# Patient Record
Sex: MALE | Race: WHITE | Employment: UNEMPLOYED | URBAN - METROPOLITAN AREA
[De-identification: names, ages, dates, MRNs, and addresses within clinical notes are randomized per-mention and may not be internally consistent; named-entity substitution may affect disease eponyms.]

---
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
## 2022-01-06 ENCOUNTER — VIRTUAL VISIT (OUTPATIENT)
Dept: PEDIATRICS | Facility: CLINIC | Age: 22
End: 2022-01-06
Payer: COMMERCIAL

## 2022-01-06 DIAGNOSIS — F41.9 ANXIETY: Primary | ICD-10-CM

## 2022-01-06 DIAGNOSIS — F32.1 CURRENT MODERATE EPISODE OF MAJOR DEPRESSIVE DISORDER WITHOUT PRIOR EPISODE (H): ICD-10-CM

## 2022-01-06 PROCEDURE — 99215 OFFICE O/P EST HI 40 MIN: CPT | Mod: GT | Performed by: PEDIATRICS

## 2022-01-06 NOTE — PATIENT INSTRUCTIONS
"          Thank you for choosing the [facility]'s Developmental and Behavioral Pediatrics Department for your care!     To schedule appointments please contact the [facility] at [phone].     For medication refills please contact your child's pharmacy.  Your pharmacy will direct you to contact the clinic if there are no refills left or, for \"schedule II\" (controlled substances), if there are no remaining prescription orders.  If you have been directed by your pharmacy to contact the clinic for a prescription renewal, please call us [phone] or contact us via your Epic MyChart account.  Please allow 5-7 days for your refill request to be processed and sent to your pharmacy.      For behavioral emergencies (immediate concern for your child s safety or the safety of another) please contact the Behavioral Emergency Center at [phone], go to your local Emergency Department or call 911.       For non-emergencies contact the [facility] at [phone] or reach out to us via [employer]. Please allow 3 business days for a response.      "

## 2022-01-06 NOTE — PROGRESS NOTES
Hawk Wiggins is a 21 year old male who is being evaluated via a billable video visit.      How would you like to obtain your AVS? through [employer]  Primary method for receiving video invitation: Text to cell phone: [phone]  If the video visit is dropped, the invitation should be resent by: N/A  Will anyone else be joining your video visit? Yasemin Khan, EMT    Video Start Time: 4:25 PM  Video-Visit Details    Type of service:  Video Visit    Video End Time:5:01 PM    Originating Location (pt. Location): Home    Distant Location (provider location):  [place] [facility] [employer] [facility]    Platform used for Video Visit: [email or web address]     ASSESSMENT:   1. ADHD, combined type.   2. Anxiety, periodic compulsions and obsessions; in remission.   3. Major depressive disorder, single episode, moderate.  4. Recent syncopal episode with accompanying symptoms.     RECOMMENDATIONS:   1. Diagnostic:  No new diagnostic studies. Due for an annual check-up with a wt/ht/BP.   2. 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 On academic probation spring semester 2022. Will access tutoring as necessary for his two courses this semester.   3. Diet:  Encourage nutritious diet.  4. Sleep:  Encourage regular sleep schedule.   5. Self-monitoring: No changes.   6. Medication: Continue fluoxetine 20 mg daily.   7. Self-regulation:  No changes.  8. Behavior modification: Continue to incorporate health-promoting behaviors.   9. Therapy: Re-establish weekly individual therapy as soon as possible.  10. Follow-up: Monthly    Appealed an academic suspension from [facility]. He got his appeal granted. He will restart classes on January 10.     He will be on probation for the semester and they will evaluate his GPA and completion rate to determine if he should stay.     His sense is that his classes over the summer were too rigorous to be compatible with what he was doing with his mental health  "over the summer.     This fall, he got a C, a B, and an A and completed 3 courses.     He's taking two courses this spring (history and a required math) and thinks he'll be able to navigate them pretty well. He's continuing to pursue an English degree. He describes himself as \"cautiously optimistic.\" He finds that college can \"bite you really quickly\" if you're not doing well.     For this semester, he's planning to take advantage of the tutoring services at school. He has not taken advantage of the tutoring sessions at [facility]. He thinks he'll trigger this if ever he notices he's finding he's not tracking the concepts.     In terms of emotional health, he's feeling stable now. He finds that his wellness is a little tied to the amount of \"drama\" he's experiencing in conflicts with his mother. She is worried that choices he's making in his personal life are affecting his studies. He is prioritizing school, then work, then social life. He think's he'll be able to make that commitment if he needs to.     He is about correction to his degree; about 2-3 more semesters to get the associate's degree and his generals completed.     Longer term, he thinks he may want to apply his skills in English. He's worried about whether he can make the kind of salary he wants to support himself pursuing a career in this field.     Hawk is a bit ambivalent about whether he will be able to live on his own in 2-3 years. He's worried about how expensive living on his own is.     41 minutes spent on the date of the encounter doing chart review, history and exam, documentation and further activities per the note    "

## 2022-01-06 NOTE — LETTER
1/6/2022      RE: Hawk Wiggins  [address]  [place] [address]       Hawk Wiggins is a 21 year old male who is being evaluated via a billable video visit.      How would you like to obtain your AVS? through [employer]  Primary method for receiving video invitation: Text to cell phone: [phone]  If the video visit is dropped, the invitation should be resent by: N/A  Will anyone else be joining your video visit? No    Kingsley Khan, EMT    Video Start Time: 4:25 PM  Video-Visit Details    Type of service:  Video Visit    Video End Time:5:01 PM    Originating Location (pt. Location): Home    Distant Location (provider location):  [employer] [facility] [employer]    Platform used for Video Visit: Arcamed     ASSESSMENT:   1. ADHD, combined type.   2. Anxiety, periodic compulsions and obsessions; in remission.   3. Major depressive disorder, single episode, moderate.  4. Recent syncopal episode with accompanying symptoms.     RECOMMENDATIONS:   1. Diagnostic:  No new diagnostic studies. Due for an annual check-up with a wt/ht/BP.   2. 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 On academic probation spring semester 2022. Will access tutoring as necessary for his two courses this semester.   3. Diet:  Encourage nutritious diet.  4. Sleep:  Encourage regular sleep schedule.   5. Self-monitoring: No changes.   6. Medication: Continue fluoxetine 20 mg daily.   7. Self-regulation:  No changes.  8. Behavior modification: Continue to incorporate health-promoting behaviors.   9. Therapy: Re-establish weekly individual therapy as soon as possible.  10. Follow-up: Monthly    Appealed an academic suspension from [employer]. He got his appeal granted. He will restart classes on January 10.     He will be on probation for the semester and they will evaluate his GPA and completion rate to determine if he should stay.     His sense is that his classes over the  "summer were too rigorous to be compatible with what he was doing with his mental health over the summer.     This fall, he got a C, a B, and an A and completed 3 courses.     He's taking two courses this spring (history and a required math) and thinks he'll be able to navigate them pretty well. He's continuing to pursue an English degree. He describes himself as \"cautiously optimistic.\" He finds that college can \"bite you really quickly\" if you're not doing well.     For this semester, he's planning to take advantage of the tutoring services at school. He has not taken advantage of the tutoring sessions at [facility]. He thinks he'll trigger this if ever he notices he's finding he's not tracking the concepts.     In terms of emotional health, he's feeling stable now. He finds that his wellness is a little tied to the amount of \"drama\" he's experiencing in conflicts with his mother. She is worried that choices he's making in his personal life are affecting his studies. He is prioritizing school, then work, then social life. He think's he'll be able to make that commitment if he needs to.     He is about detention to his degree; about 2-3 more semesters to get the associate's degree and his generals completed.     Longer term, he thinks he may want to apply his skills in English. He's worried about whether he can make the kind of salary he wants to support himself pursuing a career in this field.     Hawk is a bit ambivalent about whether he will be able to live on his own in 2-3 years. He's worried about how expensive living on his own is.     41 minutes spent on the date of the encounter doing chart review, history and exam, documentation and further activities per the note        Crystal Chirinos MD    "

## 2022-02-17 ENCOUNTER — VIRTUAL VISIT (OUTPATIENT)
Dept: PEDIATRICS | Facility: CLINIC | Age: 22
End: 2022-02-17
Payer: COMMERCIAL

## 2022-02-17 DIAGNOSIS — F41.9 ANXIETY: ICD-10-CM

## 2022-02-17 DIAGNOSIS — F90.2 ATTENTION DEFICIT HYPERACTIVITY DISORDER, COMBINED TYPE: Primary | ICD-10-CM

## 2022-02-17 DIAGNOSIS — F32.1 CURRENT MODERATE EPISODE OF MAJOR DEPRESSIVE DISORDER WITHOUT PRIOR EPISODE (H): ICD-10-CM

## 2022-02-17 PROCEDURE — 99215 OFFICE O/P EST HI 40 MIN: CPT | Mod: GT | Performed by: PEDIATRICS

## 2022-02-17 NOTE — LETTER
2/17/2022      RE: Hawk Wiggins  [address]  [place] [address]       Hawk Wiggins is a 21 year old male who is being evaluated via a billable video visit.      How would you like to obtain your AVS? through [employer]  Primary method for receiving video invitation: Text to cell phone: [phone]  If the video visit is dropped, the invitation should be resent by: N/A  Will anyone else be joining your video visit? Yasemin Khan, EMT    Video Start Time: 4:26 PM  Video-Visit Details    Type of service:  Video Visit    Video End Time:5:04 PM    Originating Location (pt. Location): Home    Distant Location (provider location):  [employer]    Platform used for Video Visit: [employer]    ASSESSMENT:   1. ADHD, combined type.   2. Anxiety, periodic compulsions and obsessions; in remission.   3. Major depressive disorder, single episode, moderate.  4. Recent syncopal episode with accompanying symptoms.     RECOMMENDATIONS:   1. Diagnostic:  No new diagnostic studies. Due for an annual check-up with a wt/ht/BP.   2. 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 On academic probation spring semester 2022. Will access tutoring as necessary for his two courses this semester.   3. Diet:  Encourage nutritious diet.  4. Sleep:  Encourage regular sleep schedule.   5. Self-monitoring: No changes.   6. Medication: Continue fluoxetine 20 mg daily.   7. Self-regulation:  No changes.  8. Behavior modification: Continue to incorporate health-promoting behaviors.   9. Therapy: Re-establish weekly individual therapy as soon as possible.  10. Follow-up: Monthly    Focusing on school and keeping his grades up. Sometimes he struggles with motivation. Each day the motivation seems to get better over time. Life is a little boring because it's the same schedule every day. He is aware of his need to succeed at school.     He has had some difficulties  "over the last month. His girlfriend, Amira, cheated and they broke up. It was \"really hard\" and \"the truth is, the pain doesn't really go away but you get strong enough to get through it.\"     He is thinking about going to [place] for a few days over spring break. He's been FaceTiming and texting with someone online who lives in [place]. His mother is concerned about him doing that and isn't willing to finance the trip.     Discussed challenges of young adult life, independence, academic success, and finding ordinary kia in any circumstances.    Denies depression relapse/mood difficulty/safety concerns.         43 minutes spent on the date of the encounter doing chart review, history and exam, documentation and further activities per the note        Crystal Chirinos MD    "

## 2022-02-17 NOTE — PATIENT INSTRUCTIONS
"            Thank you for choosing the [facility]'s Developmental and Behavioral Pediatrics Department for your care!     To schedule appointments please contact the [facility] at [phone].     For medication refills please contact your child's pharmacy.  Your pharmacy will direct you to contact the clinic if there are no refills left or, for \"schedule II\" (controlled substances), if there are no remaining prescription orders.  If you have been directed by your pharmacy to contact the clinic for a prescription renewal, please call us [phone] or contact us via your Epic MyChart account.  Please allow 5-7 days for your refill request to be processed and sent to your pharmacy.      For behavioral emergencies (immediate concern for your child s safety or the safety of another) please contact the Behavioral Emergency Center at [phone], go to your local Emergency Department or call 911.       For non-emergencies contact the [facility] at [phone] or reach out to us via [employer]. Please allow 3 business days for a response.      "

## 2022-02-17 NOTE — PROGRESS NOTES
Hawk Wiggins is a 21 year old male who is being evaluated via a billable video visit.      How would you like to obtain your AVS? through [employer]  Primary method for receiving video invitation: Text to cell phone: [phone]  If the video visit is dropped, the invitation should be resent by: N/A  Will anyone else be joining your video visit? Yasemin Khan, EMT    Video Start Time: 4:26 PM  Video-Visit Details    Type of service:  Video Visit    Video End Time:5:04 PM    Originating Location (pt. Location): Home    Distant Location (provider location):  [employer] [facility] [employer] [facility]    Platform used for Video Visit: Eviti    ASSESSMENT:   1. ADHD, combined type.   2. Anxiety, periodic compulsions and obsessions; in remission.   3. Major depressive disorder, single episode, moderate.  4. Recent syncopal episode with accompanying symptoms.     RECOMMENDATIONS:   1. Diagnostic:  No new diagnostic studies. Due for an annual check-up with a wt/ht/BP.   2. 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 On academic probation spring semester 2022. Will access tutoring as necessary for his two courses this semester.   3. Diet:  Encourage nutritious diet.  4. Sleep:  Encourage regular sleep schedule.   5. Self-monitoring: No changes.   6. Medication: Continue fluoxetine 20 mg daily.   7. Self-regulation:  No changes.  8. Behavior modification: Continue to incorporate health-promoting behaviors.   9. Therapy: Re-establish weekly individual therapy as soon as possible.  10. Follow-up: Monthly    Focusing on school and keeping his grades up. Sometimes he struggles with motivation. Each day the motivation seems to get better over time. Life is a little boring because it's the same schedule every day. He is aware of his need to succeed at school.     He has had some difficulties over the last month. His girlfriend, Amira, cheated and they broke up. It was  "\"really hard\" and \"the truth is, the pain doesn't really go away but you get strong enough to get through it.\"     He is thinking about going to [place] for a few days over spring break. He's been FaceTiming and texting with someone online who lives in [place]. His mother is concerned about him doing that and isn't willing to finance the trip.     Discussed challenges of young adult life, independence, academic success, and finding ordinary kia in any circumstances.    Denies depression relapse/mood difficulty/safety concerns.         43 minutes spent on the date of the encounter doing chart review, history and exam, documentation and further activities per the note    "

## 2022-03-17 ENCOUNTER — VIRTUAL VISIT (OUTPATIENT)
Dept: PEDIATRICS | Facility: CLINIC | Age: 22
End: 2022-03-17
Payer: COMMERCIAL

## 2022-03-17 DIAGNOSIS — F41.9 ANXIETY: ICD-10-CM

## 2022-03-17 DIAGNOSIS — F32.1 CURRENT MODERATE EPISODE OF MAJOR DEPRESSIVE DISORDER WITHOUT PRIOR EPISODE (H): Primary | ICD-10-CM

## 2022-03-17 PROCEDURE — 99215 OFFICE O/P EST HI 40 MIN: CPT | Mod: GT | Performed by: PEDIATRICS

## 2022-03-17 NOTE — PATIENT INSTRUCTIONS
"Thank you for choosing the [facility]'s Developmental and Behavioral Pediatrics Department for your care!     To schedule appointments please contact the [facility] at [phone].     For medication refills please contact your child's pharmacy.  Your pharmacy will direct you to contact the clinic if there are no refills left or, for \"schedule II\" (controlled substances), if there are no remaining prescription orders.  If you have been directed by your pharmacy to contact the clinic for a prescription renewal, please call us [phone] or contact us via your Epic MyChart account.  Please allow 5-7 days for your refill request to be processed and sent to your pharmacy.      For behavioral emergencies (immediate concern for your child s safety or the safety of another) please contact the Behavioral Emergency Center at [phone], go to your local Emergency Department or call 911.       For non-emergencies contact the [facility] at [phone] or reach out to us via [employer]. Please allow 3 business days for a response.      "

## 2022-03-17 NOTE — PROGRESS NOTES
Hawk Wiggins is a 21 year old male who is being evaluated via a billable video visit.      How would you like to obtain your AVS? through [employer]  Primary method for receiving video invitation: Text to cell phone: [phone]  If the video visit is dropped, the invitation should be resent by: N/A  Will anyone else be joining your video visit? No      Video Start Time: 4:24 PM  Video-Visit Details    Type of service:  Video Visit    Video End Time:5:04 PM    Originating Location (pt. Location): Home    Distant Location (provider location):  [employer]    Platform used for Video Visit: CVRx     ASSESSMENT:   1. ADHD, combined type.   2. Anxiety, periodic compulsions and obsessions; in remission.   3. Major depressive disorder, single episode, moderate.  4. Recent syncopal episode with accompanying symptoms.     RECOMMENDATIONS:   1. Diagnostic:  No new diagnostic studies. Due for an annual check-up with a wt/ht/BP.   2. 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 On academic probation spring semester 2022. Will access tutoring as necessary for his two courses this semester.   3. Diet:  Encourage nutritious diet.  4. Sleep:  Encourage regular sleep schedule.   5. Self-monitoring: No changes.   6. Medication: Continue fluoxetine 20 mg daily.   7. Self-regulation:  Navigating emotional challenges of a recent break-up with infidelity.  8. Behavior modification: Continue to incorporate health-promoting behaviors.   9. Therapy: Re-establish weekly individual therapy as soon as possible.  10. Follow-up: Monthly    Still experiencing the emotional effects of being cheated on in January by his girlfriend. He's experiencing another wave of those emotions now. Discussed the personal impact of being cheated on and the way on interpreting that experience.     41 minutes spent on the date of the encounter doing chart review, history and exam,  documentation and further activities per the note

## 2022-03-17 NOTE — LETTER
3/17/2022      RE: Hawk Wiggins  [address]  [place] [address]       Hawk Wiggins is a 21 year old male who is being evaluated via a billable video visit.      How would you like to obtain your AVS? through [employer]  Primary method for receiving video invitation: Text to cell phone: [phone]  If the video visit is dropped, the invitation should be resent by: N/A  Will anyone else be joining your video visit? No      Video Start Time: 4:24 PM  Video-Visit Details    Type of service:  Video Visit    Video End Time:5:04 PM    Originating Location (pt. Location): Home    Distant Location (provider location):  [employer] [facility] [employer]    Platform used for Video Visit: [email or web address]     ASSESSMENT:   1. ADHD, combined type.   2. Anxiety, periodic compulsions and obsessions; in remission.   3. Major depressive disorder, single episode, moderate.  4. Recent syncopal episode with accompanying symptoms.     RECOMMENDATIONS:   1. Diagnostic:  No new diagnostic studies. Due for an annual check-up with a wt/ht/BP.   2. 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 On academic probation spring semester 2022. Will access tutoring as necessary for his two courses this semester.   3. Diet:  Encourage nutritious diet.  4. Sleep:  Encourage regular sleep schedule.   5. Self-monitoring: No changes.   6. Medication: Continue fluoxetine 20 mg daily.   7. Self-regulation:  Navigating emotional challenges of a recent break-up with infidelity.  8. Behavior modification: Continue to incorporate health-promoting behaviors.   9. Therapy: Re-establish weekly individual therapy as soon as possible.  10. Follow-up: Monthly    Still experiencing the emotional effects of being cheated on in January by his girlfriend. He's experiencing another wave of those emotions now. Discussed the personal impact of being cheated on and the way on interpreting that experience.     41  minutes spent on the date of the encounter doing chart review, history and exam, documentation and further activities per the note        Crystal Chirinos MD

## 2022-04-21 ENCOUNTER — VIRTUAL VISIT (OUTPATIENT)
Dept: PEDIATRICS | Facility: CLINIC | Age: 22
End: 2022-04-21
Payer: COMMERCIAL

## 2022-04-21 DIAGNOSIS — F41.9 ANXIETY: Primary | ICD-10-CM

## 2022-04-21 DIAGNOSIS — F32.1 CURRENT MODERATE EPISODE OF MAJOR DEPRESSIVE DISORDER WITHOUT PRIOR EPISODE (H): ICD-10-CM

## 2022-04-21 PROCEDURE — 99214 OFFICE O/P EST MOD 30 MIN: CPT | Mod: GT | Performed by: PEDIATRICS

## 2022-04-21 NOTE — LETTER
4/21/2022    RE: Hawk Wiggins  [address]  [facility] [address]     Dear Colleague,    Thank you for referring your patient, Hawk Wiggins, to the [facility]. Please see a copy of my visit note below.    Hawk Wiggins is a 21 year old male who is being evaluated via a billable video visit.      How would you like to obtain your AVS? through [employer]  Primary method for receiving video invitation: Text to cell phone: [phone]  If the video visit is dropped, the invitation should be resent by: N/A  Will anyone else be joining your video visit? No    Kingsley Khan, EMT    Video Start Time: 11:22 AM  Video-Visit Details    Type of service:  Video Visit    Video End Time:11:49 AM    Originating Location (pt. Location): Home    Distant Location (provider location[facility]  [facility]    Platform used for Video Visit: [facility]    ASSESSMENT:   1. ADHD, combined type.   2. Anxiety, periodic compulsions and obsessions; in remission.   3. Major depressive disorder, single episode, moderate.  4. Recent syncopal episode with accompanying symptoms.     RECOMMENDATIONS:   1. Diagnostic:  No new diagnostic studies. Due for an annual check-up with a wt/ht/BP.   2. 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 On academic probation spring semester 2022. Will access tutoring as necessary for his two courses this semester.   3. Diet:  Encourage nutritious diet.  4. Sleep:  Encourage regular sleep schedule.   5. Self-monitoring: No changes.   6. Medication: Continue fluoxetine 20 mg daily for now. This does not seem to be therapeutically adequate at this time. Complicating the picture, he is on a break from individual therapy. Once individual therapy is reestablished, we can make a medication change. Hawk would rather stop fluoxetine. Past attempts to maximize dose were ineffective and reduced his appetite. He took  "sertraline about 9 years ago but doesn't remember if it was effective. Ecitalopram would be a good option as an alternative to fluoxetine.   7. Self-regulation:  Navigating emotional challenges of a recent break-up with infidelity.  8. Behavior modification: Continue to incorporate health-promoting behaviors.   9. Therapy: Re-establish weekly individual therapy as soon as possible.  10. Follow-up: Monthly. Reach out through [facility] if therapy established and we can move forward with medication change.     \"I'm hanging in there. College sucks sometimes.\" He finds the work to be tedious at times. He has one more month before the end of the semester ends. He thinks the semester will end successfully.     \"I've endured a lot of change.\" Was in a relationship at the beginning of the semester and now they're broken up.     He's been experiencing \"a lot of anxiety\" recently. He and his mother are both generally reluctant to take medication. He has noticed that his anxiety has gotten quite a bit worse recently \"even to the point of almost being a panic attack.\" He finds that his anxiety is in the background and feels \"like a pit in the stomach that makes me doubt myself.\"     He's explored other practices to \"subdue\" the anxiety reaction. He's wondering about a medication regimen.     30 minutes spent on the date of the encounter doing chart review, history and exam, documentation and further activities per the note    Again, thank you for allowing me to participate in the care of your patient.      Sincerely,    Crystal Chirinos MD  "

## 2022-04-21 NOTE — PROGRESS NOTES
Hawk Wiggins is a 21 year old male who is being evaluated via a billable video visit.      How would you like to obtain your AVS? through [employer] [place]  Primary method for receiving video invitation: Text to cell phone: [phone]  If the video visit is dropped, the invitation should be resent by: N/A  Will anyone else be joining your video visit? No    Kingsley Khan, EMT    Video Start Time: 11:22 AM  Video-Visit Details    Type of service:  Video Visit    Video End Time:11:49 AM    Originating Location (pt. Location): Home    Distant Location (provider location):  [employer] [facility] [employer] [facility]    Platform used for Video Visit: [employer]    ASSESSMENT:   1. ADHD, combined type.   2. Anxiety, periodic compulsions and obsessions; in remission.   3. Major depressive disorder, single episode, moderate.  4. Recent syncopal episode with accompanying symptoms.     RECOMMENDATIONS:   1. Diagnostic:  No new diagnostic studies. Due for an annual check-up with a wt/ht/BP.   2. 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 On academic probation spring semester 2022. Will access tutoring as necessary for his two courses this semester.   3. Diet:  Encourage nutritious diet.  4. Sleep:  Encourage regular sleep schedule.   5. Self-monitoring: No changes.   6. Medication: Continue fluoxetine 20 mg daily for now. This does not seem to be therapeutically adequate at this time. Complicating the picture, he is on a break from individual therapy. Once individual therapy is reestablished, we can make a medication change. Hawk would rather stop fluoxetine. Past attempts to maximize dose were ineffective and reduced his appetite. He took sertraline about 9 years ago but doesn't remember if it was effective. Ecitalopram would be a good option as an alternative to fluoxetine.   7. Self-regulation:  Navigating emotional challenges of a recent break-up with infidelity.  8. Behavior  "modification: Continue to incorporate health-promoting behaviors.   9. Therapy: Re-establish weekly individual therapy as soon as possible.  10. Follow-up: Monthly. Reach out through [facility] if therapy established and we can move forward with medication change.     \"I'm hanging in there. College sucks sometimes.\" He finds the work to be tedious at times. He has one more month before the end of the semester ends. He thinks the semester will end successfully.     \"I've endured a lot of change.\" Was in a relationship at the beginning of the semester and now they're broken up.     He's been experiencing \"a lot of anxiety\" recently. He and his mother are both generally reluctant to take medication. He has noticed that his anxiety has gotten quite a bit worse recently \"even to the point of almost being a panic attack.\" He finds that his anxiety is in the background and feels \"like a pit in the stomach that makes me doubt myself.\"     He's explored other practices to \"subdue\" the anxiety reaction. He's wondering about a medication regimen.     30 minutes spent on the date of the encounter doing chart review, history and exam, documentation and further activities per the note    "

## 2022-04-25 ENCOUNTER — TELEPHONE (OUTPATIENT)
Dept: PSYCHIATRY | Facility: CLINIC | Age: 22
End: 2022-04-25
Payer: COMMERCIAL

## 2022-04-25 ENCOUNTER — TELEPHONE (OUTPATIENT)
Dept: PEDIATRICS | Facility: CLINIC | Age: 22
End: 2022-04-25
Payer: COMMERCIAL

## 2022-04-25 NOTE — TELEPHONE ENCOUNTER
[facility]    Phone Message    May a detailed message be left on voicemail: yes     Reason for Call: Medication Question or concern regarding medication   Prescription Clarification  Name of Medication: Didn't recall in   Prescribing Provider: Dr. ALFIE Chirinos   Pharmacy: [facility] [address] [address] (Ph: [phone])   What on the order needs clarification? Requesting info regarding medications - note from appt 04/21 noted possible medication change from fluoxetine to escitalopram          Action Taken: Message routed to:  Other: P MIDB PEDS DB    Travel Screening: Not Applicable

## 2022-04-27 NOTE — TELEPHONE ENCOUNTER
See MyChart encounter dated 4/25 for further details.    This encounter will be closed.    Brigid Goodson RN

## 2022-05-19 ENCOUNTER — VIRTUAL VISIT (OUTPATIENT)
Dept: PEDIATRICS | Facility: CLINIC | Age: 22
End: 2022-05-19
Payer: COMMERCIAL

## 2022-05-19 DIAGNOSIS — F90.2 ATTENTION DEFICIT HYPERACTIVITY DISORDER, COMBINED TYPE: ICD-10-CM

## 2022-05-19 DIAGNOSIS — F41.9 ANXIETY: ICD-10-CM

## 2022-05-19 DIAGNOSIS — F32.1 CURRENT MODERATE EPISODE OF MAJOR DEPRESSIVE DISORDER WITHOUT PRIOR EPISODE (H): Primary | ICD-10-CM

## 2022-05-19 PROCEDURE — 99214 OFFICE O/P EST MOD 30 MIN: CPT | Mod: GT | Performed by: PEDIATRICS

## 2022-05-19 NOTE — PROGRESS NOTES
Hawk Wiggins is a 21 year old male who is being evaluated via a billable video visit.      How would you like to obtain your AVS? through [employer]  Primary method for receiving video invitation: Text to cell phone: [phone]   If the video visit is dropped, the invitation should be resent by: N/A  Will anyone else be joining your video visit? No    Beverley Diez CMA    Video Start Time: 10:41 AM  Video-Visit Details    Type of service:  Video Visit    Video End Time:11:11 AM    Originating Location (pt. Location): Home    Distant Location (provider location):  [employer] FOR THE [employer] [facility]    Platform used for Video Visit: [employer]    ASSESSMENT:   1. ADHD, combined type.   2. Anxiety, periodic compulsions and obsessions; in remission.   3. Major depressive disorder, single episode, moderate.  4. Recent syncopal episode with accompanying symptoms.     RECOMMENDATIONS:   1. Diagnostic:  No new diagnostic studies. Due for an annual check-up with a wt/ht/BP.   2. 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 On academic probation spring semester 2022. Heading into summer term. Exploring taking courses at the University.    3. Diet:  Encourage nutritious diet.  4. Sleep:  Encourage regular sleep schedule.   5. Self-monitoring: No changes.   6. Medication: Continue ecitalopram 10 mg daily. Medication working well.   7. Self-regulation:  Begin to diversify your network of people and network of resources to meet the needs that were being met in that relationship. Continue to accept the time needed to navigate a grief reaction.  8. Behavior modification: Continue to incorporate health-promoting behaviors.   9. Therapy: Individual therapy every 1-2 weeks. Male therapist who is at [facility] [place]. That therapy is very helpful.   10. Follow-up: Monthly. Reach out through [employer] if therapy established and we can move forward with medication change.     New semester is  "starting up at [employer]. He's on a break right now. Spring semester went really well and Hawk ended up with good grades. He made some changes to keep up with his work and get everything completed before turning it in. He \"just grinded out all the school work.\" He was very glad he was able to do it. The \"education piece is fantastic.\" He's going to see if he can get a few courses at the [employer] in the fall.     He was reminded of his ex-girlfriend when he drove by her house. He \"trying to fight back against this attachment to her.\" He seems to be having a strong grief reaction when reminded of his relationship with his ex-girlfriend.     The medication is \"really helpful.\" He's got a \"better mindset\" and \"it's been a game changer.\" He is a little concerned about his     35 minutes spent on the date of the encounter doing chart review, history and exam, documentation and further activities per the note    "

## 2022-05-19 NOTE — PATIENT INSTRUCTIONS
"    Thank you for choosing the [facility]'s Developmental and Behavioral Pediatrics Department for your care!     To schedule appointments please contact the [facility] at [phone].     For medication refills please contact your child's pharmacy.  Your pharmacy will direct you to contact the clinic if there are no refills left or, for \"schedule II\" (controlled substances), if there are no remaining prescription orders.  If you have been directed by your pharmacy to contact the clinic for a prescription renewal, please call us [phone] or contact us via your Epic MyChart account.  Please allow 5-7 days for your refill request to be processed and sent to your pharmacy.      For behavioral emergencies (immediate concern for your child s safety or the safety of another) please contact the Behavioral Emergency Center at [phone], go to your local Emergency Department or call 911.       For non-emergencies contact the [facility] at [phone] or reach out to us via Network. Please allow 3 business days for a response.    "

## 2022-05-19 NOTE — LETTER
"  5/19/2022      RE: Hawk Wiggins  [address]  [facility] [address]     Dear Colleague,    Thank you for referring your patient, Hawk Wiggins, to the [facility]. Please see a copy of my visit note below.      ASSESSMENT:   1. ADHD, combined type.   2. Anxiety, periodic compulsions and obsessions; in remission.   3. Major depressive disorder, single episode, moderate.  4. Recent syncopal episode with accompanying symptoms.     RECOMMENDATIONS:   1. Diagnostic:  No new diagnostic studies. Due for an annual check-up with a wt/ht/BP.   2. Education:  [employer]. Approximately half way to an associate's degree in English. On academic probation spring semester 2022. Heading into summer term. Exploring taking courses at the [employer].    3. Diet:  Encourage nutritious diet.  4. Sleep:  Encourage regular sleep schedule.   5. Self-monitoring: No changes.   6. Medication: Continue ecitalopram 10 mg daily. Medication working well.   7. Self-regulation:  Begin to diversify your network of people and network of resources to meet the needs that were being met in that relationship. Continue to accept the time needed to navigate a grief reaction.  8. Behavior modification: Continue to incorporate health-promoting behaviors.   9. Therapy: Individual therapy every 1-2 weeks. Male therapist who is at [facility] [place]. That therapy is very helpful.   10. Follow-up: Monthly. Reach out through [facility] if therapy established and we can move forward with medication change.     New semester is starting up at [employer]. He's on a break right now. Spring semester went really well and Hawk ended up with good grades. He made some changes to keep up with his work and get everything completed before turning it in. He \"just grinded out all the school work.\" He was very glad he was able to do it. The \"education piece is fantastic.\" He's going to see if he can get a few courses at the " "[employer] in the fall.     He was reminded of his ex-girlfriend when he drove by her house. He \"trying to fight back against this attachment to her.\" He seems to be having a strong grief reaction when reminded of his relationship with his ex-girlfriend.     The medication is \"really helpful.\" He's got a \"better mindset\" and \"it's been a game changer.\" He is a little concerned about his     35 minutes spent on the date of the encounter doing chart review, history and exam, documentation and further activities per the note        Again, thank you for allowing me to participate in the care of your patient.      Sincerely,    Crystal Chirinos MD    "

## 2022-06-13 ENCOUNTER — TELEPHONE (OUTPATIENT)
Dept: PEDIATRICS | Facility: CLINIC | Age: 22
End: 2022-06-13
Payer: COMMERCIAL

## 2022-06-13 NOTE — TELEPHONE ENCOUNTER
A return call was placed to the mother of Hawk Wiggins, Ghazal, today (06/13/22) at 1:58 PM to inform her that the extent of the Consent to Communicate would not cover something of a legal nature, and we discussed whether or not Hawk would be able to speak to the concern that they were referencing.  Ghazal has confirmed that Hawk can discuss this with us and she will ask that he call the [facility] clinic to provide more detail.    Brigid Goodson RN    Please transfer Hawk's return phone call to Fouzia PALACIOS.  Hawk can leave a detailed message if he goes to [facility].

## 2022-06-13 NOTE — TELEPHONE ENCOUNTER
" Health Call Center    Phone Message    May a detailed message be left on voicemail: yes     Reason for Call: Other: Father requesting to speak with Dr. Chirinos regarding \"legalities\". Declined speaking with an RN and insistent on only speaking with Taran. Further details not given.      *Per dad, please call Ghazal cornejo    Action Taken: Message routed to:  Other: P MIDB PEDS DB    Travel Screening: Not Applicable                                                                        "

## 2022-06-16 ENCOUNTER — VIRTUAL VISIT (OUTPATIENT)
Dept: PEDIATRICS | Facility: CLINIC | Age: 22
End: 2022-06-16
Payer: COMMERCIAL

## 2022-06-16 DIAGNOSIS — F32.1 CURRENT MODERATE EPISODE OF MAJOR DEPRESSIVE DISORDER WITHOUT PRIOR EPISODE (H): ICD-10-CM

## 2022-06-16 DIAGNOSIS — F41.9 ANXIETY: ICD-10-CM

## 2022-06-16 PROCEDURE — 99214 OFFICE O/P EST MOD 30 MIN: CPT | Mod: GT | Performed by: PEDIATRICS

## 2022-06-16 RX ORDER — ESCITALOPRAM OXALATE 20 MG/1
20 TABLET ORAL DAILY
Qty: 30 TABLET | Refills: 3 | Status: SHIPPED | OUTPATIENT
Start: 2022-06-16 | End: 2022-10-06

## 2022-06-16 NOTE — PATIENT INSTRUCTIONS
"Thank you for choosing the [facility]'s Developmental and Behavioral Pediatrics Department for your care!     To schedule appointments please contact the [facility] at [phone].     For medication refills please contact your child's pharmacy.  Your pharmacy will direct you to contact the clinic if there are no refills left or, for \"schedule II\" (controlled substances), if there are no remaining prescription orders.  If you have been directed by your pharmacy to contact the clinic for a prescription renewal, please call us [phone] or contact us via your Epic MyChart account.  Please allow 5-7 days for your refill request to be processed and sent to your pharmacy.      For behavioral emergencies (immediate concern for your child s safety or the safety of another) please contact the Behavioral Emergency Center at [phone], go to your local Emergency Department or call 911.       For non-emergencies contact the [facility] at [phone] or reach out to us via [employer]. Please allow 3 business days for a response.    "

## 2022-06-16 NOTE — PROGRESS NOTES
"Hawk Wiggins is a 21 year old male who is being evaluated via a billable video visit.        How would you like to obtain your AVS? through [employer]  Primary method for receiving video invitation: Text to cell phone: [phone]  If the video visit is dropped, the invitation should be resent by: Text to cell phone: [phone]  Will anyone else be joining your video visit? No      Type of service:  Video Visit    Video-Visit Details    Video Start Time: 3:11 PM    Video End Time:3:38 PM  Originating Location (pt. Location): Home    Distant Location (provider location):  [facility] [place]    Platform used for Video Visit: [employer]    ASSESSMENT:   1. ADHD, combined type.   2. Anxiety, periodic compulsions and obsessions; in remission.   3. Major depressive disorder, single episode, moderate.  4. Recent syncopal episode with accompanying symptoms.     RECOMMENDATIONS:   1. Diagnostic:  No new diagnostic studies. Due for an annual check-up with a wt/ht/BP.   2. 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 On academic probation spring semester 2022. Heading into summer term. Exploring taking courses at the [employer].    3. Diet:  Encourage nutritious diet.  4. Sleep:  Encourage regular sleep schedule.   5. Self-monitoring: No changes.   6. Medication: Still having significant breakthrough anxiety on 10 mg daily of ecitalopram. Increase to 20 mg daily.   7. Self-regulation:  Begin to diversify your network of people and network of resources to meet the needs that were being met in that relationship. Continue to accept the time needed to navigate a grief reaction.  8. Behavior modification: Continue to incorporate health-promoting behaviors.   9. Therapy: Individual therapy every 1-2 weeks. Male therapist who is at [facility] [place]. That therapy is very helpful.   10. Follow-up: Monthly.     \"I'm hanging in there.\" and \"Healing from the Amira situation.\" He " "finds that he really misses her. He misses the stuff they used to do together. Missing the connection and support of the relationship.     He's taking 10 mg of ecitalopram. It \"takes the edge off\" of the perseverative anxiety. However, he gets some breakthrough anxiety and would like to have a better medication approach for his anxiety. He is open to increasing to his ecitalopram or adding hydroxyzine to his regimen.     31 minutes spent on the date of the encounter doing chart review, history and exam, documentation and further activities per the note    "

## 2022-06-16 NOTE — LETTER
"  6/16/2022      RE: Hawk Wiggins  [address]  [place] [facility] [place] [facility] [address]     Dear Colleague,    Thank you for referring your patient, Hawk Wiggins, to the [facility]. Please see a copy of my visit note below.    Hawk Wiggins is a 21 year old male who is being evaluated via a billable video visit.        Distant Location (provider location):  [facility] [employer]    Platform used for Video Visit: "[employer]    ASSESSMENT:   1. ADHD, combined type.   2. Anxiety, periodic compulsions and obsessions; in remission.   3. Major depressive disorder, single episode, moderate.  4. Recent syncopal episode with accompanying symptoms.     RECOMMENDATIONS:   1. Diagnostic:  No new diagnostic studies. Due for an annual check-up with a wt/ht/BP.   2. 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 On academic probation spring semester 2022. Heading into summer term. Exploring taking courses at the [employer].    3. Diet:  Encourage nutritious diet.  4. Sleep:  Encourage regular sleep schedule.   5. Self-monitoring: No changes.   6. Medication: Still having significant breakthrough anxiety on 10 mg daily of ecitalopram. Increase to 20 mg daily.   7. Self-regulation:  Begin to diversify your network of people and network of resources to meet the needs that were being met in that relationship. Continue to accept the time needed to navigate a grief reaction.  8. Behavior modification: Continue to incorporate health-promoting behaviors.   9. Therapy: Individual therapy every 1-2 weeks. Male therapist who is at [facility] [place]. That therapy is very helpful.   10. Follow-up: Monthly.     \"I'm hanging in there.\" and \"Healing from the Amira situation.\" He finds that he really misses her. He misses the stuff they used to do together. Missing the connection and support of the relationship.     He's taking 10 mg of ecitalopram. It \"takes the " "edge off\" of the perseverative anxiety. However, he gets some breakthrough anxiety and would like to have a better medication approach for his anxiety. He is open to increasing to his ecitalopram or adding hydroxyzine to his regimen.     31 minutes spent on the date of the encounter doing chart review, history and exam, documentation and further activities per the note      Again, thank you for allowing me to participate in the care of your patient.      Sincerely,    Crystal Chirinos MD    "

## 2022-06-17 ENCOUNTER — TELEPHONE (OUTPATIENT)
Dept: PEDIATRICS | Facility: CLINIC | Age: 22
End: 2022-06-17

## 2022-06-17 DIAGNOSIS — F41.9 ANXIETY: Primary | ICD-10-CM

## 2022-06-17 NOTE — TELEPHONE ENCOUNTER
Parent called asking if provider had sent the medication to pharmacy. Also patient wants to start on hydroxyzine (I believe that is what mom stated)   Can you please call to advise  Thank you

## 2022-06-21 NOTE — TELEPHONE ENCOUNTER
If you can set up a prescription for 25 mg Q6H prn, dispense 10 tabs with 3 refills, that would be great. Thank you.    Kaila Hernandez

## 2022-06-22 RX ORDER — HYDROXYZINE HYDROCHLORIDE 25 MG/1
25 TABLET, FILM COATED ORAL EVERY 6 HOURS PRN
Qty: 10 TABLET | Refills: 3 | Status: SHIPPED | OUTPATIENT
Start: 2022-06-22

## 2022-07-07 ENCOUNTER — OFFICE VISIT (OUTPATIENT)
Dept: PEDIATRICS | Facility: CLINIC | Age: 22
End: 2022-07-07
Payer: COMMERCIAL

## 2022-07-07 VITALS
BODY MASS INDEX: 20.64 KG/M2 | HEART RATE: 78 BPM | WEIGHT: 139.8 LBS | DIASTOLIC BLOOD PRESSURE: 72 MMHG | SYSTOLIC BLOOD PRESSURE: 111 MMHG

## 2022-07-07 DIAGNOSIS — F32.1 CURRENT MODERATE EPISODE OF MAJOR DEPRESSIVE DISORDER WITHOUT PRIOR EPISODE (H): ICD-10-CM

## 2022-07-07 DIAGNOSIS — F41.9 ANXIETY: Primary | ICD-10-CM

## 2022-07-07 PROCEDURE — 99214 OFFICE O/P EST MOD 30 MIN: CPT | Performed by: PEDIATRICS

## 2022-07-07 NOTE — PATIENT INSTRUCTIONS
"Thank you for choosing the [facility]'s Developmental and Behavioral Pediatrics Department for your care!     To schedule appointments please contact the [facility] at [phone].     For medication refills please contact your child's pharmacy.  Your pharmacy will direct you to contact the clinic if there are no refills left or, for \"schedule II\" (controlled substances), if there are no remaining prescription orders.  If you have been directed by your pharmacy to contact the clinic for a prescription renewal, please call us [phone] or contact us via your Epic MyChart account.  Please allow 5-7 days for your refill request to be processed and sent to your pharmacy.      For behavioral emergencies (immediate concern for your child s safety or the safety of another) please contact the Behavioral Emergency Center at [phone], go to your local Emergency Department or call 911.       For non-emergencies contact the [facility] at [phone] or reach out to us via Nav[employer]ell. Please allow 3 business days for a response.   "

## 2022-07-07 NOTE — LETTER
"  7/7/2022      RE: Hawk Wiggins  [address]  [facility] [address]     Dear Colleague,    Thank you for referring your patient, Hawk Wiggins, to the [facility]. Please see a copy of my visit note below.    ASSESSMENT:   1. ADHD, combined type.   2. Anxiety, periodic compulsions and obsessions; in remission.   3. Major depressive disorder, single episode, moderate.  4. Recent syncopal episode with accompanying symptoms.     RECOMMENDATIONS:   1. Diagnostic:  No new diagnostic studies.    2. Education:  Transferring to the [employer] for the fall. Will be pursuing his degree part time.   3. Diet:  Encourage nutritious diet. Healthy BMI today.  4. Sleep:  Encourage regular sleep schedule.   5. Self-monitoring: No changes.   6. Medication: Still having significant breakthrough anxiety on 10 mg daily of ecitalopram. Increase to 20 mg daily.   7. Self-regulation:  Begin to diversify your network of people and network of resources to meet the needs that were being met in that relationship. Continue to accept the time needed to navigate a grief reaction.  8. Behavior modification: Continue to incorporate health-promoting behaviors.   9. Therapy: Individual therapy every 1-2 weeks. Male therapist who is at [facility]. That therapy is very helpful.   10. Work: Working part time, 20 hours per week.   11. Follow-up: Monthly.     Hawk is generally doing well. He is looking forward to a trip to [place] and then transferring to the [employer] for part-time school. He will commute to the  from home.     He continues to work through \"being cheated on\" by his ex-girlfriend and the grief/loss/anger associated with that experience.     This fall, he is hoping to \"expand the social [place]\" and increase the variety in his daily routine.     35 minutes spent on the date of the encounter doing chart review, history and exam, documentation and further activities per " the note      Again, thank you for allowing me to participate in the care of your patient.      Sincerely,    Crystal Chirinos MD

## 2022-07-07 NOTE — PROGRESS NOTES
"ASSESSMENT:   1. ADHD, combined type.   2. Anxiety, periodic compulsions and obsessions; in remission.   3. Major depressive disorder, single episode, moderate.  4. Recent syncopal episode with accompanying symptoms.     RECOMMENDATIONS:   1. Diagnostic:  No new diagnostic studies.    2. Education:  Transferring to the [employer] for the fall. Will be pursuing his degree part time.   3. Diet:  Encourage nutritious diet. Healthy BMI today.  4. Sleep:  Encourage regular sleep schedule.   5. Self-monitoring: No changes.   6. Medication: Still having significant breakthrough anxiety on 10 mg daily of ecitalopram. Increase to 20 mg daily.   7. Self-regulation:  Begin to diversify your network of people and network of resources to meet the needs that were being met in that relationship. Continue to accept the time needed to navigate a grief reaction.  8. Behavior modification: Continue to incorporate health-promoting behaviors.   9. Therapy: Individual therapy every 1-2 weeks. Male therapist who is at [facility] [place]. That therapy is very helpful.   10. Work: Working part time, 20 hours per week.   11. Follow-up: Monthly.     Hawk is generally doing well. He is looking forward to a trip to [place] and then transferring to the [employer] for part-time school. He will commute to the  from home.     He continues to work through \"being cheated on\" by his ex-girlfriend and the grief/loss/anger associated with that experience.     This fall, he is hoping to \"expand the social [place]\" and increase the variety in his daily routine.     35 minutes spent on the date of the encounter doing chart review, history and exam, documentation and further activities per the note  "

## 2022-07-12 ENCOUNTER — TELEPHONE (OUTPATIENT)
Dept: PEDIATRICS | Facility: CLINIC | Age: 22
End: 2022-07-12

## 2022-07-12 NOTE — TELEPHONE ENCOUNTER
Hi Dr. Chirinos,    There are ongoing events, which are deeply affecting Hawk we thought you should be aware of.     Several months ago Hawk entered into a relationship with a woman who was already pregnant by another man.  The relationship ended when the woman gave birth and the father resumed his interest in the mother, Amira.      Hawk was heartbroken, but he was slowly healing in my view.  Over this last holiday weekend while out with his friends, one of his buddies pointed out Amira to Hawk at a gathering.  This lousy act drove Hawk back to a tough place, with a great deal of mental anguish.      We are hopeful he will discuss some of this with you.      Thanks for whatever help you can provide.      Keith Wiggins

## 2022-08-04 ENCOUNTER — VIRTUAL VISIT (OUTPATIENT)
Dept: PEDIATRICS | Facility: CLINIC | Age: 22
End: 2022-08-04
Payer: COMMERCIAL

## 2022-08-04 DIAGNOSIS — F90.2 ATTENTION DEFICIT HYPERACTIVITY DISORDER, COMBINED TYPE: Primary | ICD-10-CM

## 2022-08-04 DIAGNOSIS — F41.9 ANXIETY: ICD-10-CM

## 2022-08-04 DIAGNOSIS — F32.1 CURRENT MODERATE EPISODE OF MAJOR DEPRESSIVE DISORDER WITHOUT PRIOR EPISODE (H): ICD-10-CM

## 2022-08-04 PROCEDURE — 99214 OFFICE O/P EST MOD 30 MIN: CPT | Performed by: PEDIATRICS

## 2022-08-04 RX ORDER — METHYLPHENIDATE HYDROCHLORIDE 5 MG/1
5 TABLET ORAL DAILY
Qty: 30 TABLET | Refills: 0 | Status: SHIPPED | OUTPATIENT
Start: 2022-08-04 | End: 2023-04-27

## 2022-08-04 NOTE — PROGRESS NOTES
"ASSESSMENT:   1. ADHD, combined type, relapse of symptoms in 2022.   2. Anxiety, periodic compulsions and obsessions; in remission.   3. Major depressive disorder, single episode, moderate.  4. Recent syncopal episode with accompanying symptoms.     RECOMMENDATIONS:   1. Diagnostic:  No new diagnostic studies.    2. Education:  Transferring to the [employer] for the fall. Will be pursuing his degree part time.   3. Diet:  Encourage nutritious diet. Healthy BMI today.  4. Sleep:  Encourage regular sleep schedule.   5. Self-monitoring: No changes.   6. Medication: Continue ecitalopram 20 mg daily. Start SA MPH 5mg daily prn studying time.   7. Self-regulation:  No changes.   8. Behavior modification: Continue to incorporate health-promoting behaviors.   9. Therapy: Individual therapy every 1-2 weeks. Male therapist who is at [facility]. That therapy is very helpful.   10. Work: Working part time, 20 hours per week.   11. Follow-up: Monthly.     Hawk Wiggins is a 22year 0month old last seen on July 7.   Estimated body mass index is 20.64 kg/m  as calculated from the following:    Height as of 3/5/20: 5' 9.02\" (175.3 cm).    Weight as of 7/7/22: 139 lb 12.8 oz (63.4 kg).     Hawk is \"doing pretty fantastic\" and \"can't complain.\" He just got back from a trip to [facility]. He had a really good time. He got to see a lot of things and hang out with his friends. It was nice to \"exist elsewhere outside of [place].\"    In general, he's noticing that he's having more difficulty with focusing. He's having trouble with keeping his brain from \"drifting elsewhere.\" Hawk would like to be able to concentrate for 2 hours at a time to do homework.     He's concerned about appetite suppression. He also remembers not being very noticeably affected by the medication in the past.     Hawk is looking forward to transferring to the [employer] in the fall and is focused on  \"keeping his life on track.\"    Next visit is " September 8    30 minutes spent on the date of the encounter doing chart review, history and exam, documentation and further activities per the note

## 2022-08-04 NOTE — PROGRESS NOTES
"Hawk Wiggins is a 22 year old male who is being evaluated via a billable video visit.        How would you like to obtain your AVS? through [employer]  Primary method for receiving video invitation: [employer]  If the video visit is dropped, the invitation should be resent by: N/A  Will anyone else be joining your video visit? No  {If patient encounters technical issues they should call [phone] :150956}    Type of service:  Video Visit    Video-Visit Details    Video Start Time: {video visit start/end time for provider to select:850558}    Video End Time:{video visit start/end time for provider to select:277568}  Originating Location (pt. Location): {video visit patient location:830595::\"Home\"}    Distant Location (provider location):  [facility]    Platform used for Video Visit: {Virtual Visit Platforms:819712::\"Well\"}        "

## 2022-08-04 NOTE — PATIENT INSTRUCTIONS
"Thank you for choosing the [facility]'s Developmental and Behavioral Pediatrics Department for your care!     To schedule appointments please contact the [facility] at [phone].     For medication refills please contact your child's pharmacy.  Your pharmacy will direct you to contact the clinic if there are no refills left or, for \"schedule II\" (controlled substances), if there are no remaining prescription orders.  If you have been directed by your pharmacy to contact the clinic for a prescription renewal, please call us [phone] or contact us via your Epic MyChart account.  Please allow 5-7 days for your refill request to be processed and sent to your pharmacy.      For behavioral emergencies (immediate concern for your child s safety or the safety of another) please contact the Behavioral Emergency Center at [phone], go to your local Emergency Department or call 911.       For non-emergencies contact the [facility] at [phone] or reach out to us via Weemba. Please allow 3 business days for a response.     Thank you for choosing the [facility]'s Developmental and Behavioral Pediatrics Department for your care!     To schedule appointments please contact the [facility] at [phone].     For medication refills please contact your child's pharmacy.  Your pharmacy will direct you to contact the clinic if there are no refills left or, for \"schedule II\" (controlled substances), if there are no remaining prescription orders.  If you have been directed by your pharmacy to contact the clinic for a prescription renewal, please call us [phone] or contact us via your Epic MyChart account.  Please allow 5-7 days for your refill request to be processed and sent to your pharmacy.      For behavioral emergencies (immediate concern for your child s " safety or the safety of another) please contact the Behavioral Emergency Center at [phone], go to your local Emergency Department or call 911.       For non-emergencies contact the [facility] at [phone] or reach out to us via [email or web address]. Please allow 3 business days for a response.

## 2022-08-04 NOTE — PROGRESS NOTES
"Hawk Wiggins is a 22 year old male who is being evaluated via a billable video visit.        How would you like to obtain your AVS? through [employer]  Primary method for receiving video invitation: [employer]  If the video visit is dropped, the invitation should be resent by: [employer]  Will anyone else be joining your video visit? No  {If patient encounters technical issues they should call [phone] :[id number]}    Type of service:  Video Visit    Video-Visit Details    Video Start Time: {video visit start/end time for provider to select:004319}    Video End Time:{video visit start/end time for provider to select:216128}  Originating Location (pt. Location): {video visit patient location:313946::\"Home\"}    Distant Location (provider location):  [facility]    Platform used for Video Visit: {Virtual Visit Platforms:714877::\"Well\"}        "

## 2022-08-05 ENCOUNTER — TELEPHONE (OUTPATIENT)
Dept: PEDIATRICS | Facility: CLINIC | Age: 22
End: 2022-08-05

## 2022-08-05 NOTE — TELEPHONE ENCOUNTER
"[facility] Call Center    Phone Message    May a detailed message be left on voicemail: yes     Reason for Call: Medication Question or concern regarding medication   Prescription Clarification  Name of Medication: methylphenidate (RITALIN) 5 MG tablet  Prescribing Provider: Crystal Chirinos MD   Pharmacy: [facility] [address] [address]   What on the order needs clarification? Mom has concerns about Hawk being on the Ritalin medication as she says it is \"such an intense medication.\" Wondering if there would be an alternative.    Action Taken: Message routed to:  Other: P MIDB DB    Travel Screening: Not Applicable                                                                      "

## 2022-08-05 NOTE — TELEPHONE ENCOUNTER
A [employer] message was sent to Ghazal and Hawk today (08/05/22) to request additional information about Ghazal's questions / concerns.  Please see [employer] encounter for details and ongoing communication.    Brigid Goodson RN

## 2022-08-21 ENCOUNTER — HEALTH MAINTENANCE LETTER (OUTPATIENT)
Age: 22
End: 2022-08-21

## 2022-09-08 ENCOUNTER — VIRTUAL VISIT (OUTPATIENT)
Dept: PEDIATRICS | Facility: CLINIC | Age: 22
End: 2022-09-08
Payer: COMMERCIAL

## 2022-09-08 DIAGNOSIS — F32.1 CURRENT MODERATE EPISODE OF MAJOR DEPRESSIVE DISORDER WITHOUT PRIOR EPISODE (H): ICD-10-CM

## 2022-09-08 DIAGNOSIS — F41.9 ANXIETY: ICD-10-CM

## 2022-09-08 DIAGNOSIS — F90.2 ATTENTION DEFICIT HYPERACTIVITY DISORDER, COMBINED TYPE: Primary | ICD-10-CM

## 2022-09-08 PROCEDURE — 99215 OFFICE O/P EST HI 40 MIN: CPT | Mod: GT | Performed by: PEDIATRICS

## 2022-09-08 NOTE — PATIENT INSTRUCTIONS
"Thank you for choosing the [facility]'s Developmental and Behavioral Pediatrics Department for your care!     To schedule appointments please contact the [facility] at [phone].     For medication refills please contact your child's pharmacy.  Your pharmacy will direct you to contact the clinic if there are no refills left or, for \"schedule II\" (controlled substances), if there are no remaining prescription orders.  If you have been directed by your pharmacy to contact the clinic for a prescription renewal, please call us [phone] or contact us via your Epic MyChart account.  Please allow 5-7 days for your refill request to be processed and sent to your pharmacy.      For behavioral emergencies (immediate concern for your child s safety or the safety of another) please contact the Behavioral Emergency Center at [phone], go to your local Emergency Department or call 911.       For non-emergencies contact the [facility] at [phone] or reach out to us via [employer]. Please allow 3 business days for a response.   "

## 2022-09-08 NOTE — LETTER
"  9/8/2022      RE: Hawk Wiggins  [address]  [facility] [address]     Dear Colleague,    Thank you for referring your patient, Hawk Wiggins, to the [facility]. Please see a copy of my visit note below.       ASSESSMENT:   1. ADHD, combined type, relapse of symptoms in 2022.   2. Anxiety, periodic compulsions and obsessions; in remission.   3. Major depressive disorder, single episode, moderate.  4. Recent syncopal episode with accompanying symptoms.     RECOMMENDATIONS:   1. Diagnostic:  No new diagnostic studies.    2. Education:  At the [employer] undergraduate program. Will be pursuing his degree part time.   3. Diet:  Encourage nutritious diet.  4. Sleep:  Encourage regular sleep schedule.   5. Self-monitoring: No changes.   6. Medication: Continue ecitalopram 20 mg daily. Continue SA MPH 5mg daily prn studying time.   7. Self-regulation:  No changes.   8. Behavior modification: Continue to incorporate health-promoting behaviors.   9. Therapy: Individual therapy every 1-2 weeks. Male therapist who is at [facility] [place]. That therapy is very helpful.   10. Work: Working part time, 20 hours per week.   11. Follow-up: Monthly.         Hawk Wiggins is a 22year 1month old last seen on August 4.   Parents: Ghazal and Keith  Estimated body mass index is 20.64 kg/m  as calculated from the following:    Height as of 3/5/20: 5' 9.02\" (175.3 cm).    Weight as of 7/7/22: 139 lb 12.8 oz (63.4 kg).   No height and weight on file for this encounter.     Hawk has been feeling like he's \"in a rut\" and that \"life has lost some of its luster.\" It's hard for Hawk to figure out what it is that he enjoys about life. He's uncertain about what he enjoys.     Hawk had a long conversation with his mother and she's a little frustrated because she wants him to make college a priority. Hawk is finding college difficult right now because of his mood.     Hawk identifies " insufficient sleep as his most likely source of difficulties.     Encouraged focusing on building a series of consistent health-promoting behaviors to stabilize mood and energy. In particular, sleep, routine, physical activity and nutrition. In addition, modulating screen time to support wellness is important.     Hawk has started at the  and is using his SA MPH only rarely.     Next visit is October 20    52 minutes spent on the date of the encounter doing chart review, history and exam, documentation and further activities per the note      Again, thank you for allowing me to participate in the care of your patient.      Sincerely,    Crystal Chirinos MD

## 2022-09-08 NOTE — PROGRESS NOTES
"   ASSESSMENT:   1. ADHD, combined type, relapse of symptoms in 2022.   2. Anxiety, periodic compulsions and obsessions; in remission.   3. Major depressive disorder, single episode, moderate.  4. Recent syncopal episode with accompanying symptoms.     RECOMMENDATIONS:   1. Diagnostic:  No new diagnostic studies.    2. Education:  At the [employer] undergraduate program. Will be pursuing his degree part time.   3. Diet:  Encourage nutritious diet.  4. Sleep:  Encourage regular sleep schedule.   5. Self-monitoring: No changes.   6. Medication: Continue ecitalopram 20 mg daily. Continue SA MPH 5mg daily prn studying time.   7. Self-regulation:  No changes.   8. Behavior modification: Continue to incorporate health-promoting behaviors.   9. Therapy: Individual therapy every 1-2 weeks. Male therapist who is at [facility] [place]. That therapy is very helpful.   10. Work: Working part time, 20 hours per week.   11. Follow-up: Monthly.         Hawk Wiggins is a 22year 1month old last seen on August 4.   Parents: Lorelei  Estimated body mass index is 20.64 kg/m  as calculated from the following:    Height as of 3/5/20: 5' 9.02\" (175.3 cm).    Weight as of 7/7/22: 139 lb 12.8 oz (63.4 kg).   No height and weight on file for this encounter.     Hawk has been feeling like he's \"in a rut\" and that \"life has lost some of its luster.\" It's hard for Hawk to figure out what it is that he enjoys about life. He's uncertain about what he enjoys.     Hawk had a long conversation with his mother and she's a little frustrated because she wants him to make college a priority. Hawk is finding college difficult right now because of his mood.     Hawk identifies insufficient sleep as his most likely source of difficulties.     Encouraged focusing on building a series of consistent health-promoting behaviors to stabilize mood and energy. In particular, sleep, routine, physical activity and nutrition. In addition, " modulating screen time to support wellness is important.     Hawk has started at the  and is using his SA MPH only rarely.     Next visit is October 20    52 minutes spent on the date of the encounter doing chart review, history and exam, documentation and further activities per the note

## 2022-09-08 NOTE — PROGRESS NOTES
Hawk Wiggins is a 22 year old male who is being evaluated via a billable video visit.        How would you like to obtain your AVS? through [employer]  Primary method for receiving video invitation: Text to cell phone: [phone]  If the video visit is dropped, the invitation should be resent by: N/A  Will anyone else be joining your video visit? No      Type of service:  Video Visit    Video-Visit Details    Video Start Time: 10:41 AM    Video End Time:11:26 AM  Originating Location (pt. Location): Home    Distant Location (provider location):  [facility]    Platform used for Video Visit: Marvin

## 2022-10-06 DIAGNOSIS — F41.9 ANXIETY: ICD-10-CM

## 2022-10-06 DIAGNOSIS — F32.1 CURRENT MODERATE EPISODE OF MAJOR DEPRESSIVE DISORDER WITHOUT PRIOR EPISODE (H): ICD-10-CM

## 2022-10-06 RX ORDER — ESCITALOPRAM OXALATE 20 MG/1
20 TABLET ORAL DAILY
Qty: 90 TABLET | Refills: 0 | Status: SHIPPED | OUTPATIENT
Start: 2022-10-06 | End: 2022-10-20

## 2022-10-06 NOTE — TELEPHONE ENCOUNTER
"Refill request received from: pharmacy    Last appointment: 9/8/2022    RTC: 4 weeks    Canceled appointments: 0    No Showed appointments: 0    Follow up scheduled: 10/20/2022    Requested medication(s) (copy and paste last order information):    Disp Refills Start End LAWRENCE   escitalopram (LEXAPRO) 20 MG tablet 30 tablet 3 6/16/2022  No   Sig - Route: Take 1 tablet (20 mg) by mouth daily - Oral   Sent to pharmacy as: Escitalopram Oxalate 20 MG Oral Tablet (LEXAPRO)   Class: E-Prescribe   Order: [id number]   E-Prescribing Status: Receipt confirmed by pharmacy (6/16/2022  3:23 PM CDT)         Date medication last filled per outside med information: 9/7/2022    Months of medication pended per MIDB refill protocol: 3    Request was sent to RN[facility] for approval    If patient is due for follow up \"Appointment required for further refills [phone]\" was placed in the sig of the medication and encounter was routed to scheduling pool to encourage follow up.     Medication pended by: Gauri Meza CMA    "

## 2022-10-20 ENCOUNTER — VIRTUAL VISIT (OUTPATIENT)
Dept: PEDIATRICS | Facility: CLINIC | Age: 22
End: 2022-10-20
Payer: COMMERCIAL

## 2022-10-20 DIAGNOSIS — F41.9 ANXIETY: ICD-10-CM

## 2022-10-20 DIAGNOSIS — F32.1 CURRENT MODERATE EPISODE OF MAJOR DEPRESSIVE DISORDER WITHOUT PRIOR EPISODE (H): ICD-10-CM

## 2022-10-20 PROCEDURE — 99214 OFFICE O/P EST MOD 30 MIN: CPT | Mod: GT | Performed by: PEDIATRICS

## 2022-10-20 RX ORDER — ESCITALOPRAM OXALATE 20 MG/1
20 TABLET ORAL DAILY
Qty: 90 TABLET | Refills: 3 | Status: SHIPPED | OUTPATIENT
Start: 2022-10-20 | End: 2022-12-08

## 2022-10-20 NOTE — PROGRESS NOTES
Hawk Wiggins is a 22 year old male who is being evaluated via a billable video visit.        How would you like to obtain your AVS? through [employer]  Primary method for receiving video invitation: Text to cell phone: [phone]  If the video visit is dropped, the invitation should be resent by: Text to cell phone: [phone]  Will anyone else be joining your video visit? No      Type of service:  Video Visit    Video-Visit Details    Video Start Time: 10:50 AM    Video End Time:11:10 AM  Originating Location (pt. Location): Home    Distant Location (provider location):  [facility]    Platform used for Video Visit: Marvin

## 2022-10-20 NOTE — PATIENT INSTRUCTIONS
"Thank you for choosing the [facility]'s Developmental and Behavioral Pediatrics Department for your care!     To schedule appointments please contact the [facility] at [phone].     For medication refills please contact your child's pharmacy.  Your pharmacy will direct you to contact the clinic if there are no refills left or, for \"schedule II\" (controlled substances), if there are no remaining prescription orders.  If you have been directed by your pharmacy to contact the clinic for a prescription renewal, please call us [phone] or contact us via your Epic MyChart account.  Please allow 5-7 days for your refill request to be processed and sent to your pharmacy.      For behavioral emergencies (immediate concern for your child s safety or the safety of another) please contact the Behavioral Emergency Center at [phone], go to your local Emergency Department or call 911.       For non-emergencies contact the [facility] at [phone] or reach out to us via [employer]. Please allow 3 business days for a response.   "

## 2022-10-20 NOTE — LETTER
"10/20/2022    RE: Hawk Wiggins  [address]  [facility] [facility] [address]     Dear Colleague,    Thank you for referring your patient, Hawk Wiggins, to the [facility]. Please see a copy of my visit note below.    Hawk Wiggins is a 22 year old male who is being evaluated via a billable video visit.        How would you like to obtain your AVS? through [employer]  Primary method for receiving video invitation: Text to cell phone: [phone]  If the video visit is dropped, the invitation should be resent by: Text to cell phone: [phone]  Will anyone else be joining your video visit? No      Type of service:  Video Visit    Video-Visit Details    Video Start Time: 10:50 AM    Video End Time:11:10 AM  Originating Location (pt. Location): Home    Distant Location (provider location):  [employer] [facility]    Platform used for Video Visit: Yebhi          ASSESSMENT:   1. ADHD, combined type, relapse of symptoms in 2022.   2. Anxiety, periodic compulsions and obsessions; in remission.   3. Major depressive disorder, single episode, moderate.  4. Recent syncopal episode with accompanying symptoms.     RECOMMENDATIONS:   1. Diagnostic:  No new diagnostic studies.    2. Education:  At the [employer] undergraduate program. Will be pursuing his degree part time.   3. Diet:  Encourage nutritious diet.  4. Sleep:  Encourage regular sleep schedule.   5. Self-monitoring: No changes.   6. Medication: Continue ecitalopram 20 mg daily. Do a trial as desired of [facility] 2.5-5mg daily prn studying time.  We can make modifications if needed. Side effects will last <4 hours if they happen.    7. Self-regulation:  No changes.   8. Behavior modification: Continue to incorporate health-promoting behaviors. Add some creativity/passion project +/- connection to address feeling of \"blah.\" Consider screen toxicity as an alternative explanation.   9. Therapy: Individual " "therapy every 1-2 weeks. Male therapist who is at [facility]. That therapy is very helpful.   10. Work: Working part time, 20 hours per week.   11. Follow-up: Monthly.     Hawk Wiggins is a 22year 2month old last seen on September 8.   Parents: Ghazal and Keith  Estimated body mass index is 20.64 kg/m  as calculated from the following:    Height as of 3/5/20: 5' 9.02\" (175.3 cm).    Weight as of 7/7/22: 139 lb 12.8 oz (63.4 kg).   No height and weight on file for this encounter.     This was a telemedicine visit with the physician located at home.     Today, Hawk Wiggins presents on his own today. He says that things are kind of \"stagnant\" right now. It's mostly \"neutral\" and \"boring\" but not unpleasant.     He hasn't started his short-acting stimulant yet because he's concerned he'll have a negative reaction to it.     His anxiety is much improved and generally his mood is good.     Next visit is December 8     37 minutes spent on the date of the encounter doing chart review, history and exam, documentation and further activities per the note    Again, thank you for allowing me to participate in the care of your patient.      Sincerely,    Crystal Chirinos MD  "

## 2022-10-20 NOTE — PROGRESS NOTES
"ASSESSMENT:   1. ADHD, combined type, relapse of symptoms in 2022.   2. Anxiety, periodic compulsions and obsessions; in remission.   3. Major depressive disorder, single episode, moderate.  4. Recent syncopal episode with accompanying symptoms.     RECOMMENDATIONS:   1. Diagnostic:  No new diagnostic studies.    2. Education:  At the [employer] undergraduate program. Will be pursuing his degree part time.   3. Diet:  Encourage nutritious diet.  4. Sleep:  Encourage regular sleep schedule.   5. Self-monitoring: No changes.   6. Medication: Continue ecitalopram 20 mg daily. Do a trial as desired of SA [facility]PH 2.5-5mg daily prn studying time.  We can make modifications if needed. Side effects will last <4 hours if they happen.    7. Self-regulation:  No changes.   8. Behavior modification: Continue to incorporate health-promoting behaviors. Add some creativity/passion project +/- connection to address feeling of \"blah.\" Consider screen toxicity as an alternative explanation.   9. Therapy: Individual therapy every 1-2 weeks. Male therapist who is at [facility] [place]. That therapy is very helpful.   10. Work: Working part time, 20 hours per week.   11. Follow-up: Monthly.     Hawk Wiggins is a 22year 2month old last seen on September 8.   Parents: Ghazal and Keith  Estimated body mass index is 20.64 kg/m  as calculated from the following:    Height as of 3/5/20: 5' 9.02\" (175.3 cm).    Weight as of 7/7/22: 139 lb 12.8 oz (63.4 kg).   No height and weight on file for this encounter.     This was a telemedicine visit with the physician located at home.     Today, Hawk Wiggins presents on his own today. He says that things are kind of \"stagnant\" right now. It's mostly \"neutral\" and \"boring\" but not unpleasant.     He hasn't started his short-acting stimulant yet because he's concerned he'll have a negative reaction to it.     His anxiety is much improved and generally his mood is good.     Next visit is December 8 "     37 minutes spent on the date of the encounter doing chart review, history and exam, documentation and further activities per the note

## 2022-11-21 ENCOUNTER — HEALTH MAINTENANCE LETTER (OUTPATIENT)
Age: 22
End: 2022-11-21

## 2022-12-08 ENCOUNTER — VIRTUAL VISIT (OUTPATIENT)
Dept: PEDIATRICS | Facility: CLINIC | Age: 22
End: 2022-12-08
Payer: COMMERCIAL

## 2022-12-08 DIAGNOSIS — F90.2 ATTENTION DEFICIT HYPERACTIVITY DISORDER, COMBINED TYPE: Primary | ICD-10-CM

## 2022-12-08 DIAGNOSIS — F41.9 ANXIETY: ICD-10-CM

## 2022-12-08 DIAGNOSIS — F32.1 CURRENT MODERATE EPISODE OF MAJOR DEPRESSIVE DISORDER WITHOUT PRIOR EPISODE (H): ICD-10-CM

## 2022-12-08 PROCEDURE — 99214 OFFICE O/P EST MOD 30 MIN: CPT | Mod: GT | Performed by: PEDIATRICS

## 2022-12-08 RX ORDER — ESCITALOPRAM OXALATE 20 MG/1
20 TABLET ORAL DAILY
Qty: 90 TABLET | Refills: 3 | Status: SHIPPED | OUTPATIENT
Start: 2022-12-08 | End: 2023-04-27

## 2022-12-08 NOTE — PATIENT INSTRUCTIONS
"Thank you for choosing the [facility]'s Developmental and Behavioral Pediatrics Department for your care!     To schedule appointments please contact the [facility] at [phone].     For medication refills please contact your child's pharmacy.  Your pharmacy will direct you to contact the clinic if there are no refills left or, for \"schedule II\" (controlled substances), if there are no remaining prescription orders.  If you have been directed by your pharmacy to contact the clinic for a prescription renewal, please call us [phone] or contact us via your Epic MyChart account.  Please allow 5-7 days for your refill request to be processed and sent to your pharmacy.      For behavioral emergencies (immediate concern for your child s safety or the safety of another) please contact the [facility] at [phone], go to your local Emergency Department or call 911.       For non-emergencies contact the [facility] at [phone] or reach out to us via [employer]. Please allow 3 business days for a response.   "

## 2022-12-08 NOTE — PROGRESS NOTES
"Hawk Wiggins is a 22 year old male who is being evaluated via a billable video visit.        How would you like to obtain your AVS? through [employer]  Primary method for receiving video invitation: Text to cell phone: [phone]  If the video visit is dropped, the invitation should be resent by: N/A  Will anyone else be joining your video visit? No  {If patient encounters technical issues they should call [phone] :[id number]}    Type of service:  Video Visit    Video-Visit Details    Video Start Time: {video visit start/end time for provider to select:116245}    Video End Time:{video visit start/end time for provider to select:115171}  Originating Location (pt. Location): {video visit patient location:943266::\"Home\"}    Distant Location (provider location):  [facility]    Platform used for Video Visit: {Virtual Visit Platforms:039262::\"Well\"}        "

## 2022-12-08 NOTE — LETTER
"12/8/2022    RE: Hawk Wiggins  [address]  [facility] [address]     Dear Colleague,    Thank you for referring your patient, Hawk Wiggins, to the [facility] [place]. Please see a copy of my visit note below.    ASSESSMENT:   1. ADHD, combined type, relapse of symptoms in 2022.  2. Anxiety, periodic compulsions and obsessions; in remission.   3. Major depressive disorder, single episode, moderate.  4. Recent syncopal episode with accompanying symptoms.     RECOMMENDATIONS:   1. Diagnostic:  No new diagnostic studies.  Consider career exploration with online questionnaires and career counseling through college.   2. Education:  At the [employer] undergraduate program. Will be pursuing his degree part time.   3. Diet:  Encourage nutritious diet.  4. Sleep:  Encourage regular sleep schedule.   5. Self-monitoring: No changes.   6. Medication: Continue ecitalopram 20 mg daily. Do a trial as desired of SA MPH 2.5-5mg daily prn studying time.  We can make modifications if needed. Side effects will last <4 hours if they happen.    7. Self-regulation:  No changes.   8. Behavior modification: Continue to incorporate health-promoting behaviors. Add some creativity/passion project +/- connection to address feeling of \"blah.\" Consider screen toxicity as an alternative explanation.   9. Therapy: Individual therapy every 1-2 weeks. Male therapist who is at [facility] [place]. That therapy is very helpful.   10. Work: Working part time, 20 hours per week.   11. Follow-up: Monthly.     Hawk Wiggins is a 22year 4month old last seen on October 20.   Estimated body mass index is 20.64 kg/m  as calculated from the following:    Height as of 3/5/20: 5' 9.02\" (175.3 cm).    Weight as of 7/7/22: 139 lb 12.8 oz (63.4 kg).   No height and weight on file for this encounter.     This was a telemedicine visit with the physician located at home. I met with Hawk during his break from work so our time " "together was a little abbreviated.     The EMR was reviewed on the day of the visit to review my most recent note and the intervening events since the last visit. Pertinent information from that review includes the following: no significant health events. No refills requested.    Today, I visited with Hawk alone today. He shared that he's focusing on \"trying to find excitement in the little things.\" He finds that he gets bored easily. In the winter it can be particularly hard to find fun things to do.     Hawk is getting restless about staying in [place]. He'd like to see and possibly settle in another part of the world. However, he needs to become financially independent.     Next visit is January 12    30 minutes spent on the date of the encounter doing chart review, history and exam, documentation and further activities per the note    Again, thank you for allowing me to participate in the care of your patient.      Sincerely,    Crystal Chirinos MD  "

## 2022-12-08 NOTE — PROGRESS NOTES
"ASSESSMENT:   1. ADHD, combined type, relapse of symptoms in 2022.  2. Anxiety, periodic compulsions and obsessions; in remission.   3. Major depressive disorder, single episode, moderate.  4. Recent syncopal episode with accompanying symptoms.     RECOMMENDATIONS:   1. Diagnostic:  No new diagnostic studies.  Consider career exploration with online questionnaires and career counseling through college.   2. Education:  At the [employer] undergraduate program. Will be pursuing his degree part time.   3. Diet:  Encourage nutritious diet.  4. Sleep:  Encourage regular sleep schedule.   5. Self-monitoring: No changes.   6. Medication: Continue ecitalopram 20 mg daily. Do a trial as desired of SA MPH 2.5-5mg daily prn studying time.  We can make modifications if needed. Side effects will last <4 hours if they happen.    7. Self-regulation:  No changes.   8. Behavior modification: Continue to incorporate health-promoting behaviors. Add some creativity/passion project +/- connection to address feeling of \"blah.\" Consider screen toxicity as an alternative explanation.   9. Therapy: Individual therapy every 1-2 weeks. Male therapist who is at [facility] [place]. That therapy is very helpful.   10. Work: Working part time, 20 hours per week.   11. Follow-up: Monthly.     Hawk Wiggins is a 22year 4month old last seen on October 20.   Estimated body mass index is 20.64 kg/m  as calculated from the following:    Height as of 3/5/20: 5' 9.02\" (175.3 cm).    Weight as of 7/7/22: 139 lb 12.8 oz (63.4 kg).   No height and weight on file for this encounter.     This was a telemedicine visit with the physician located at home. I met with Hawk during his break from work so our time together was a little abbreviated.     The EMR was reviewed on the day of the visit to review my most recent note and the intervening events since the last visit. Pertinent information from that review includes the following: no significant health " "events. No refills requested.    Today, I visited with Hawk alone today. He shared that he's focusing on \"trying to find excitement in the little things.\" He finds that he gets bored easily. In the winter it can be particularly hard to find fun things to do.     Hawk is getting restless about staying in [place]. He'd like to see and possibly settle in another part of the world. However, he needs to become financially independent.     Next visit is January 12    30 minutes spent on the date of the encounter doing chart review, history and exam, documentation and further activities per the note    "

## 2023-01-12 ENCOUNTER — VIRTUAL VISIT (OUTPATIENT)
Dept: PEDIATRICS | Facility: CLINIC | Age: 23
End: 2023-01-12
Payer: COMMERCIAL

## 2023-01-12 DIAGNOSIS — F41.9 ANXIETY: Primary | ICD-10-CM

## 2023-01-12 DIAGNOSIS — F32.1 CURRENT MODERATE EPISODE OF MAJOR DEPRESSIVE DISORDER WITHOUT PRIOR EPISODE (H): ICD-10-CM

## 2023-01-12 PROCEDURE — 99215 OFFICE O/P EST HI 40 MIN: CPT | Mod: GT | Performed by: PEDIATRICS

## 2023-01-12 NOTE — LETTER
"  1/12/2023      RE: Hawk Wiggins  [address]  [facility] [address]     Dear Colleague,    Thank you for referring your patient, Hawk Wiggins, to the [facility]. Please see a copy of my visit note below.    ASSESSMENT:   1. ADHD, combined type, relapse of symptoms in 2022.  2. Anxiety, periodic compulsions and obsessions; in remission.   3. Major depressive disorder, single episode, moderate.  4. Recent syncopal episode with accompanying symptoms.     RECOMMENDATIONS:   1. Diagnostic:  No new diagnostic studies.  Consider career exploration with online questionnaires and career counseling through college.   2. Education:  At the [employer] undergraduate program. Full time this semester. Pursuing a degree in marketing.   3. Diet:  Encourage nutritious diet.  4. Sleep:  Encourage regular sleep schedule.   5. Self-monitoring: No changes.   6. Medication: Continue ecitalopram 20 mg daily.  7. Self-regulation:  No changes.   8. Behavior modification: Continue to incorporate health-promoting behaviors. Patience around independence and adult romantic/intimate relationships.   9. Therapy: Individual therapy every 1-2 weeks. Male therapist who is at [facility] [place]. That therapy is very helpful.   10. Work: Working part time.  11. Follow-up: Monthly.     Hawk Wiggins is a 22year 5month old last seen on December 8.   Estimated body mass index is 20.64 kg/m  as calculated from the following:    Height as of 3/5/20: 5' 9.02\" (175.3 cm).    Weight as of 7/7/22: 139 lb 12.8 oz (63.4 kg).   No height and weight on file for this encounter.     This was a telemedicine visit with the physician located at home.     The EMR was reviewed on the day of the visit to review my most recent note and the intervening events since the last visit. Pertinent information from that review includes the following: no additional visits since our last visit.    Today, Hawk Wiggins presents on his " "own. He has been a little \"bored\" during this semester. He'll be taking a full credit load this semester. He's \"got some optimism about where it's going to take me.\" He knows that he's \"going to be taking another step into adulting.\" This semester is the first where he's taken a full credit load. Right now he's taking the general courses to qualify for his degree in marketing. He is looking forward to taking the steps to get prepared for a career in marketing.     He's anticipating that \"a lot is about to change.\" In particular, he's thinking about getting back into full-time academics. He's not \"super worried.\" His mood has been \"pretty good\" and \"better than a lot of other times.\" He thinks this may be due to his routines and habits. In particular, he's adopted some \"mindset changes\" that have helped things improve. He's become better at \"combatting negative thoughts.\" He finds he can \"think about something he's excited about and shift the mood. He's aware that \"he's in more control of my mind and thinking.\"     He's really interested in travel and [place] His mother and uncle are talking about the possibility of a cruise as a family.   He's also getting more comfortable with being single and this helps him be less desperate about being in a relationship. He is still interested in being in a relationship. Discussed independence and developing \"something to offer\" and \"comfort with being single\" as key components of progress.     Next visit is April 27    40 minutes spent on the date of the encounter doing chart review, history and exam, documentation and further activities per the note        Again, thank you for allowing me to participate in the care of your patient.      Sincerely,    Crystal Chirinos MD    "

## 2023-01-12 NOTE — PROGRESS NOTES
"ASSESSMENT:   1. ADHD, combined type, relapse of symptoms in 2022.  2. Anxiety, periodic compulsions and obsessions; in remission.   3. Major depressive disorder, single episode, moderate.  4. Recent syncopal episode with accompanying symptoms.     RECOMMENDATIONS:   1. Diagnostic:  No new diagnostic studies.  Consider career exploration with online questionnaires and career counseling through college.   2. Education:  At the [employer] undergraduate program. Full time this semester. Pursuing a degree in marketing.   3. Diet:  Encourage nutritious diet.  4. Sleep:  Encourage regular sleep schedule.   5. Self-monitoring: No changes.   6. Medication: Continue ecitalopram 20 mg daily.  7. Self-regulation:  No changes.   8. Behavior modification: Continue to incorporate health-promoting behaviors. Patience around independence and adult romantic/intimate relationships.   9. Therapy: Individual therapy every 1-2 weeks. Male therapist who is at [facility] [place]. That therapy is very helpful.   10. Work: Working part time.  11. Follow-up: Monthly.     Hawk Wiggins is a 22year 5month old last seen on December 8.   Estimated body mass index is 20.64 kg/m  as calculated from the following:    Height as of 3/5/20: 5' 9.02\" (175.3 cm).    Weight as of 7/7/22: 139 lb 12.8 oz (63.4 kg).   No height and weight on file for this encounter.     This was a telemedicine visit with the physician located at home.     The EMR was reviewed on the day of the visit to review my most recent note and the intervening events since the last visit. Pertinent information from that review includes the following: no additional visits since our last visit.    Today, Hawk Wiggins presents on his own. He has been a little \"bored\" during this semester. He'll be taking a full credit load this semester. He's \"got some optimism about where it's going to take me.\" He knows that he's \"going to be taking another step into adulting.\" This semester is " "the first where he's taken a full credit load. Right now he's taking the general courses to qualify for his degree in marketing. He is looking forward to taking the steps to get prepared for a career in marketing.     He's anticipating that \"a lot is about to change.\" In particular, he's thinking about getting back into full-time academics. He's not \"super worried.\" His mood has been \"pretty good\" and \"better than a lot of other times.\" He thinks this may be due to his routines and habits. In particular, he's adopted some \"mindset changes\" that have helped things improve. He's become better at \"combatting negative thoughts.\" He finds he can \"think about something he's excited about and shift the mood. He's aware that \"he's in more control of my mind and thinking.\"     He's really interested in travel and \"getting out of the [employer] of [place] His mother and uncle are talking about the possibility of a cruise as a family.   He's also getting more comfortable with being single and this helps him be less desperate about being in a relationship. He is still interested in being in a relationship. Discussed independence and developing \"something to offer\" and \"comfort with being single\" as key components of progress.     Next visit is April 27    40 minutes spent on the date of the encounter doing chart review, history and exam, documentation and further activities per the note    "

## 2023-01-12 NOTE — PROGRESS NOTES
"Hawk Wiggins is a 22 year old male who is being evaluated via a billable video visit.        How would you like to obtain your AVS? through [employer]  Primary method for receiving video invitation: Text to cell phone: [phone]  If the video visit is dropped, the invitation should be resent by: Call Patient at [phone]  Will anyone else be joining your video visit? No  {If patient encounters technical issues they should call [phone] :[id number]}    Type of service:  Video Visit    Video-Visit Details    Video Start Time: {video visit start/end time for provider to select:230688}    Video End Time:{video visit start/end time for provider to select:830088}  Originating Location (pt. Location): {video visit patient location:630192::\"Home\"}    Distant Location (provider location):  [facility] [place] [facility]    Platform used for Video Visit: {Virtual Visit Platforms:262699::\"Well\"}      "

## 2023-01-12 NOTE — PATIENT INSTRUCTIONS
"Thank you for choosing the [facility]'s Developmental and Behavioral Pediatrics Department for your care!     To schedule appointments please contact the [facility] at [phone].     For medication refills please contact your child's pharmacy.  Your pharmacy will direct you to contact the clinic if there are no refills left or, for \"schedule II\" (controlled substances), if there are no remaining prescription orders.  If you have been directed by your pharmacy to contact the clinic for a prescription renewal, please call us [phone] or contact us via your Epic MyChart account.  Please allow 5-7 days for your refill request to be processed and sent to your pharmacy.      For behavioral emergencies (immediate concern for your child s safety or the safety of another) please contact the [facility] Emergency Center at [phone], go to your local Emergency Department or call 911.       For non-emergencies contact the [facility] at [phone] or reach out to us via [employer]. Please allow 3 business days for a response.   "

## 2023-03-27 NOTE — PATIENT INSTRUCTIONS
"              Thank you for choosing the [facility]'s Developmental and Behavioral Pediatrics Department for your care!     To schedule appointments please contact the [facility] at [phone].     For medication refills please contact your child's pharmacy.  Your pharmacy will direct you to contact the clinic if there are no refills left or, for \"schedule II\" (controlled substances), if there are no remaining prescription orders.  If you have been directed by your pharmacy to contact the clinic for a prescription renewal, please call us [phone] or contact us via your Epic MyChart account.  Please allow 5-7 days for your refill request to be processed and sent to your pharmacy.      For behavioral emergencies (immediate concern for your child s safety or the safety of another) please contact the Behavioral Emergency Center at [phone], go to your local Emergency Department or call 911.       For non-emergencies contact the [facility] at [phone] or reach out to us via Oxynade. Please allow 3 business days for a response.   " [No Acute Distress] : no acute distress [PERRL] : pupils equal round and reactive to light [Normal TMs] : both tympanic membranes were normal [Supple] : supple [Clear to Auscultation] : lungs were clear to auscultation bilaterally [Regular Rhythm] : with a regular rhythm [Soft] : abdomen soft [Non-distended] : non-distended [No Masses] : no abdominal mass palpated [Normal Bowel Sounds] : normal bowel sounds [Normal Affect] : the affect was normal [de-identified] : left  lateral malleolus ulcer  healed with some crusting

## 2023-04-27 ENCOUNTER — VIRTUAL VISIT (OUTPATIENT)
Dept: PEDIATRICS | Facility: CLINIC | Age: 23
End: 2023-04-27
Payer: COMMERCIAL

## 2023-04-27 DIAGNOSIS — F41.9 ANXIETY: ICD-10-CM

## 2023-04-27 DIAGNOSIS — F32.1 CURRENT MODERATE EPISODE OF MAJOR DEPRESSIVE DISORDER WITHOUT PRIOR EPISODE (H): ICD-10-CM

## 2023-04-27 DIAGNOSIS — F90.2 ATTENTION DEFICIT HYPERACTIVITY DISORDER, COMBINED TYPE: Primary | ICD-10-CM

## 2023-04-27 PROCEDURE — 99215 OFFICE O/P EST HI 40 MIN: CPT | Mod: VID | Performed by: PEDIATRICS

## 2023-04-27 RX ORDER — ESCITALOPRAM OXALATE 20 MG/1
20 TABLET ORAL DAILY
Qty: 90 TABLET | Refills: 3 | Status: SHIPPED | OUTPATIENT
Start: 2023-04-27 | End: 2023-06-22

## 2023-04-27 ASSESSMENT — PATIENT HEALTH QUESTIONNAIRE - PHQ9
SUM OF ALL RESPONSES TO PHQ QUESTIONS 1-9: 6
SUM OF ALL RESPONSES TO PHQ QUESTIONS 1-9: 6
10. IF YOU CHECKED OFF ANY PROBLEMS, HOW DIFFICULT HAVE THESE PROBLEMS MADE IT FOR YOU TO DO YOUR WORK, TAKE CARE OF THINGS AT HOME, OR GET ALONG WITH OTHER PEOPLE: SOMEWHAT DIFFICULT

## 2023-04-27 NOTE — PROGRESS NOTES
"Hawk Wiggins is a 22 year old male who is being evaluated via a billable video visit.        How would you like to obtain your AVS? through Moodswing  Primary method for receiving video invitation: Text to cell phone: [phone]  If the video visit is dropped, the invitation should be resent by: N/A  Will anyone else be joining your video visit? No  {If patient encounters technical issues they should call [phone] :[id number]}    Type of service:  Video Visit    Video-Visit Details    Video Start Time: {video visit start/end time for provider to select:250784}    Video End Time:{video visit start/end time for provider to select:896974}  Originating Location (pt. Location): {video visit patient location:207849::\"Home\"}    Distant Location (provider location):  [facility] [employer] [facility]    Platform used for Video Visit: {Virtual Visit Platforms:672189::\"Well\"}        "

## 2023-04-27 NOTE — PATIENT INSTRUCTIONS
"Thank you for choosing the [facility]'s Developmental and Behavioral Pediatrics Department for your care!     To schedule appointments please contact the [facility] at [phone].     For medication refills please contact your child's pharmacy.  Your pharmacy will direct you to contact the clinic if there are no refills left or, for \"schedule II\" (controlled substances), if there are no remaining prescription orders.  If you have been directed by your pharmacy to contact the clinic for a prescription renewal, please call us [phone] or contact us via your Epic MyChart account.  Please allow 5-7 days for your refill request to be processed and sent to your pharmacy.      For behavioral emergencies (immediate concern for your child s safety or the safety of another) please contact the Behavioral Emergency Center at [phone], go to your local Emergency Department or call 911.       For non-emergencies contact the [facility] at [phone] or reach out to us via InRiver. Please allow 3 business days for a response.   "

## 2023-04-27 NOTE — LETTER
"  4/27/2023      RE: Hawk Wiggins  [address]  [place] [address]     Dear Colleague,    Thank you for referring your patient, Hawk Wiggins, to the [facility]. Please see a copy of my visit note below.    ASSESSMENT:   1. ADHD, combined type, relapse of symptoms in 2022.  2. Anxiety, periodic compulsions and obsessions; in remission.   3. Major depressive disorder, single episode, moderate.  4. Recent syncopal episode with accompanying symptoms.     RECOMMENDATIONS:   1. Diagnostic:  No new diagnostic studies.    2. Education:  At the [employer] undergraduate program. Full time this semester. Pursuing a degree in marketing.   3. Diet:  Encourage nutritious diet.  4. Sleep:  Encourage regular sleep schedule.   5. Self-monitoring: No changes.   6. Medication: Continue ecitalopram 20 mg daily.  7. Self-regulation:  No changes.   8. Behavior modification: Continue to incorporate health-promoting behaviors.   9. Therapy: Individual therapy about monthly. Dulce Maria who is at [facility] [place]. Not able to participate in full therapy because of competing demands on his time.  10. Work: Working part time.  11. Follow-up: Monthly.     Hawk Wiggins is a 22year 8month old last seen on January 12.   Estimated body mass index is 20.64 kg/m  as calculated from the following:    Height as of 3/5/20: 5' 9.02\" (175.3 cm).    Weight as of 7/7/22: 139 lb 12.8 oz (63.4 kg).   No height and weight on file for this encounter.     This was a telemedicine visit with the physician located at home.     The EMR was reviewed on the day of the visit to review my most recent note and the intervening events since the last visit. Pertinent information from that review includes the following: no entries since last visit.    Answers for HPI/ROS submitted by the patient on 4/27/2023  If you checked off any problems, how difficult have these problems made it for you to do your work, take care of " "things at home, or get along with other people?: Somewhat difficult  PHQ9 TOTAL SCORE: 6, consistent with mild depression    Today, Hawk Wiggins presents in the company of his mother. She is driving him in the family car.     Hawk is \"stressed out\" and has \"way too much going on.\" Over the past few weeks he's been \"annoyed\" by lots of things that have to be done on a certain schedule. He shared that his \"plate is too full.\" \"Thank god school is wrapping up. It was too much to deal with.\" \"I am someone who can get irritated easily and my communication gets more aggravated and irritated. It makes me really upset. It makes everything a lot harder.\"     \"School has made me just miserable. Taking all these classes it's a drain on my mental health.\" Hawk is doing a full-time school schedule and working one day a week. He is frustrated that he has financial constraints and has to ask other people for money. He also feels like he's neglecting his friends.\"    He is looking forward to the summer to take a break. He would like to work over the summer and earn money and have more time to socialize.    He's been doing 6 days at the gym per week. He has been chronically sleep deprived for the past several weeks.     He's going into a marketing program in the fall. Business and marketing education, not sure if it's full time. He's not sure how many years the program is. The program is at the [employer] and he's not sure what the degree will.     Encouraged healthy habits, balance, and prioritizing time.    Next visit is 5/25, 6/22    45 minutes spent on the date of the encounter doing chart review, history and exam, documentation and further activities per the note        Again, thank you for allowing me to participate in the care of your patient.      Sincerely,    Crystal Chirinos MD    "

## 2023-04-27 NOTE — PROGRESS NOTES
"ASSESSMENT:   1. ADHD, combined type, relapse of symptoms in 2022.  2. Anxiety, periodic compulsions and obsessions; in remission.   3. Major depressive disorder, single episode, moderate.  4. Recent syncopal episode with accompanying symptoms.     RECOMMENDATIONS:   1. Diagnostic:  No new diagnostic studies.    2. Education:  At the [employer] undergraduate program. Full time this semester. Pursuing a degree in marketing.   3. Diet:  Encourage nutritious diet.  4. Sleep:  Encourage regular sleep schedule.   5. Self-monitoring: No changes.   6. Medication: Continue ecitalopram 20 mg daily.  7. Self-regulation:  No changes.   8. Behavior modification: Continue to incorporate health-promoting behaviors.   9. Therapy: Individual therapy about monthly. Dulce Maria who is at [facility] [place]. Not able to participate in full therapy because of competing demands on his time.  10. Work: Working part time.  11. Follow-up: Monthly.     Hawk Wiggins is a 22year 8month old last seen on January 12.   Estimated body mass index is 20.64 kg/m  as calculated from the following:    Height as of 3/5/20: 5' 9.02\" (175.3 cm).    Weight as of 7/7/22: 139 lb 12.8 oz (63.4 kg).   No height and weight on file for this encounter.     This was a telemedicine visit with the physician located at home.     The EMR was reviewed on the day of the visit to review my most recent note and the intervening events since the last visit. Pertinent information from that review includes the following: no entries since last visit.    Answers for HPI/ROS submitted by the patient on 4/27/2023  If you checked off any problems, how difficult have these problems made it for you to do your work, take care of things at home, or get along with other people?: Somewhat difficult  PHQ9 TOTAL SCORE: 6, consistent with mild depression    Today, Hawk Wiggins presents in the company of his mother. She is driving him in the family car.     Hawk is \"stressed out\" and " "has \"way too much going on.\" Over the past few weeks he's been \"annoyed\" by lots of things that have to be done on a certain schedule. He shared that his \"plate is too full.\" \"Thank god school is wrapping up. It was too much to deal with.\" \"I am someone who can get irritated easily and my communication gets more aggravated and irritated. It makes me really upset. It makes everything a lot harder.\"     \"School has made me just miserable. Taking all these classes it's a drain on my mental health.\" Hawk is doing a full-time school schedule and working one day a week. He is frustrated that he has financial constraints and has to ask other people for money. He also feels like he's neglecting his friends.\"    He is looking forward to the summer to take a break. He would like to work over the summer and earn money and have more time to socialize.    He's been doing 6 days at the gym per week. He has been chronically sleep deprived for the past several weeks.     He's going into a marketing program in the fall. Business and marketing education, not sure if it's full time. He's not sure how many years the program is. The program is at the [employer] and he's not sure what the degree will.     Encouraged healthy habits, balance, and prioritizing time.    Next visit is 5/25, 6/22    45 minutes spent on the date of the encounter doing chart review, history and exam, documentation and further activities per the note          "

## 2023-05-25 ENCOUNTER — VIRTUAL VISIT (OUTPATIENT)
Dept: PEDIATRICS | Facility: CLINIC | Age: 23
End: 2023-05-25
Payer: COMMERCIAL

## 2023-05-25 DIAGNOSIS — F32.1 CURRENT MODERATE EPISODE OF MAJOR DEPRESSIVE DISORDER WITHOUT PRIOR EPISODE (H): ICD-10-CM

## 2023-05-25 DIAGNOSIS — F90.2 ATTENTION DEFICIT HYPERACTIVITY DISORDER, COMBINED TYPE: Primary | ICD-10-CM

## 2023-05-25 DIAGNOSIS — F41.9 ANXIETY: ICD-10-CM

## 2023-05-25 PROCEDURE — 99213 OFFICE O/P EST LOW 20 MIN: CPT | Mod: VID | Performed by: PEDIATRICS

## 2023-05-25 NOTE — PROGRESS NOTES
"ASSESSMENT:   1. ADHD, combined type, relapse of symptoms in 2022.  2. Anxiety, periodic compulsions and obsessions; in remission.   3. Major depressive disorder, single episode, moderate.  4. Recent syncopal episode with accompanying symptoms.     RECOMMENDATIONS:   1. Diagnostic:  No new diagnostic studies.    2. Education:  At the [employer] undergraduate program. Full time this semester. Pursuing a degree in marketing.   3. Diet:  Encourage nutritious diet.  4. Sleep:  Encourage regular sleep schedule.   5. Self-monitoring: No changes.   6. Medication: Continue ecitalopram 20 mg daily.  7. Self-regulation:  No changes.   8. Behavior modification: Continue to incorporate health-promoting behaviors.   9. Therapy: Individual therapy about monthly. Dulce Maria who is at [facility] [place]. Not able to participate in full therapy because of competing demands on his time.  10. Work: Working part time.  11. Follow-up: Monthly.     Hawk Wiggins is a 22year 9month old last seen on April 27.   Estimated body mass index is 20.64 kg/m  as calculated from the following:    Height as of 3/5/20: 5' 9.02\" (175.3 cm).    Weight as of 7/7/22: 139 lb 12.8 oz (63.4 kg).   No height and weight on file for this encounter.     This was a telemedicine visit with the physician located at home.     The EMR was reviewed on the day of the visit to review my most recent note and the intervening events since the last visit. Pertinent information from that review includes the following: no additional visits since last seen    Today, Hawk Wiggins presents on his own. \"Summer has started.\" \"It's been nice to have time to myself to do what I want to do.\"     Hawk has been applying for more work to get more hours and make some more money over the summer. He would like to work 30-40 hours a week. He doesn't find work as stressful as school. He's been hanging out with his friends and \"winding down.\" In general, his stress was driven by homework " "and assignments. He is also more able to do the things he enjoys. Hanging out with friends, going to the gym, \"pushing my body to new limits.\"     His current biggest goal is establishing some financial independence. We talked about how to do this in a state of balance.     Next visit is 6/22, 8/17    23 minutes spent on the date of the encounter doing chart review, history and exam, documentation and further activities per the note    "

## 2023-05-25 NOTE — PATIENT INSTRUCTIONS
"Thank you for choosing the [facility]'s Developmental and Behavioral Pediatrics Department for your care!     To schedule appointments please contact the [facility] at [phone].     For medication refills please contact your child's pharmacy.  Your pharmacy will direct you to contact the clinic if there are no refills left or, for \"schedule II\" (controlled substances), if there are no remaining prescription orders.  If you have been directed by your pharmacy to contact the clinic for a prescription renewal, please call us [phone] or contact us via your Epic MyChart account.  Please allow 5-7 days for your refill request to be processed and sent to your pharmacy.      For behavioral emergencies (immediate concern for your child s safety or the safety of another) please contact the Behavioral Emergency Center at [phone], go to your local Emergency Department or call 911.       For non-emergencies contact the [facility] at [phone] or reach out to us via Moneylib. Please allow 3 business days for a response.   "

## 2023-05-25 NOTE — LETTER
"  5/25/2023      RE: Hawk Wiggins  [address]  [place] [address]     Dear Colleague,    Thank you for referring your patient, Hawk Wiggins, to the [facility]. Please see a copy of my visit note below.    ASSESSMENT:   1. ADHD, combined type, relapse of symptoms in 2022.  2. Anxiety, periodic compulsions and obsessions; in remission.   3. Major depressive disorder, single episode, moderate.  4. Recent syncopal episode with accompanying symptoms.     RECOMMENDATIONS:   1. Diagnostic:  No new diagnostic studies.    2. Education:  At the [employer] undergraduate program. Full time this semester. Pursuing a degree in marketing.   3. Diet:  Encourage nutritious diet.  4. Sleep:  Encourage regular sleep schedule.   5. Self-monitoring: No changes.   6. Medication: Continue ecitalopram 20 mg daily.  7. Self-regulation:  No changes.   8. Behavior modification: Continue to incorporate health-promoting behaviors.   9. Therapy: Individual therapy about monthly. Dulce Maria who is at [facility] [place]. Not able to participate in full therapy because of competing demands on his time.  10. Work: Working part time.  11. Follow-up: Monthly.     Hawk Wiggins is a 22year 9month old last seen on April 27.   Estimated body mass index is 20.64 kg/m  as calculated from the following:    Height as of 3/5/20: 5' 9.02\" (175.3 cm).    Weight as of 7/7/22: 139 lb 12.8 oz (63.4 kg).   No height and weight on file for this encounter.     This was a telemedicine visit with the physician located at home.     The EMR was reviewed on the day of the visit to review my most recent note and the intervening events since the last visit. Pertinent information from that review includes the following: no additional visits since last seen    Today, Hawk Wiggins presents on his own. \"Summer has started.\" \"It's been nice to have time to myself to do what I want to do.\"     Hawk has been applying for more work " "to get more hours and make some more money over the summer. He would like to work 30-40 hours a week. He doesn't find work as stressful as school. He's been hanging out with his friends and \"winding down.\" In general, his stress was driven by homework and assignments. He is also more able to do the things he enjoys. Hanging out with friends, going to the gym, \"pushing my body to new limits.\"     His current biggest goal is establishing some financial independence. We talked about how to do this in a state of balance.     Next visit is 6/22, 8/17    23 minutes spent on the date of the encounter doing chart review, history and exam, documentation and further activities per the note      Again, thank you for allowing me to participate in the care of your patient.      Sincerely,    Crystal Chirinos MD    "

## 2023-05-25 NOTE — PROGRESS NOTES
"Hawk Wiggins is a 22 year old male who is being evaluated via a billable video visit.        How would you like to obtain your AVS? through JustGo  Primary method for receiving video invitation: Text to cell phone: [phone]   If the video visit is dropped, the invitation should be resent by: Text to cell phone: [phone]   Will anyone else be joining your video visit? No  {If patient encounters technical issues they should call [phone] :[id number]}    Type of service:  Video Visit    Video-Visit Details    Video Start Time: {video visit start/end time for provider to select:634512}    Video End Time:{video visit start/end time for provider to select:008879}  Originating Location (pt. Location): {video visit patient location:163778::\"Home\"}    Distant Location (provider location):  [facility] [employer] [facility]    Platform used for Video Visit: {Virtual Visit Platforms:911437::\"Well\"}      "

## 2023-06-19 ENCOUNTER — TRANSFERRED RECORDS (OUTPATIENT)
Dept: HEALTH INFORMATION MANAGEMENT | Facility: CLINIC | Age: 23
End: 2023-06-19
Payer: COMMERCIAL

## 2023-06-22 ENCOUNTER — VIRTUAL VISIT (OUTPATIENT)
Dept: PEDIATRICS | Facility: CLINIC | Age: 23
End: 2023-06-22
Payer: COMMERCIAL

## 2023-06-22 DIAGNOSIS — F32.1 CURRENT MODERATE EPISODE OF MAJOR DEPRESSIVE DISORDER WITHOUT PRIOR EPISODE (H): Primary | ICD-10-CM

## 2023-06-22 DIAGNOSIS — F41.9 ANXIETY: ICD-10-CM

## 2023-06-22 PROCEDURE — 99213 OFFICE O/P EST LOW 20 MIN: CPT | Mod: VID | Performed by: PEDIATRICS

## 2023-06-22 RX ORDER — ESCITALOPRAM OXALATE 20 MG/1
20 TABLET ORAL DAILY
Qty: 90 TABLET | Refills: 3 | Status: SHIPPED | OUTPATIENT
Start: 2023-06-22 | End: 2023-10-26

## 2023-06-22 ASSESSMENT — PATIENT HEALTH QUESTIONNAIRE - PHQ9
SUM OF ALL RESPONSES TO PHQ QUESTIONS 1-9: 1
10. IF YOU CHECKED OFF ANY PROBLEMS, HOW DIFFICULT HAVE THESE PROBLEMS MADE IT FOR YOU TO DO YOUR WORK, TAKE CARE OF THINGS AT HOME, OR GET ALONG WITH OTHER PEOPLE: NOT DIFFICULT AT ALL
SUM OF ALL RESPONSES TO PHQ QUESTIONS 1-9: 1

## 2023-06-22 ASSESSMENT — PAIN SCALES - GENERAL: PAINLEVEL: NO PAIN (0)

## 2023-06-22 NOTE — PROGRESS NOTES
"ASSESSMENT:   1. ADHD, combined type, relapse of symptoms in 2022.  2. Anxiety, periodic compulsions and obsessions; in remission.   3. Major depressive disorder, single episode, moderate.     RECOMMENDATIONS:   1. Diagnostic:  No new diagnostic studies.    2. Education:  At the [employer] undergraduate program. Pursuing a degree in marketing.   3. Diet:  Encourage nutritious diet.  4. Sleep:  Encourage regular sleep schedule.   5. Physical activity: Going to the gym 6 days a week. This is really mood stabilizing for him.  6. Medication: Continue ecitalopram 20 mg daily.  7. Self-regulation:  No changes.   8. Behavior modification: Continue to incorporate health-promoting behaviors.   9. Therapy: Individual therapy about monthly. Dulce Maria who is at [facility] [place]. Not able to participate in full therapy because of competing demands on his time.  10. Work: Working part time. Trying to secure full time work as a .  11. Follow-up: Monthly.     Hawk Wiggins is a 22year 10month old last seen on May 25.   Estimated body mass index is 20.64 kg/m  as calculated from the following:    Height as of 3/5/20: 5' 9.02\" (175.3 cm).    Weight as of 7/7/22: 139 lb 12.8 oz (63.4 kg).   No height and weight on file for this encounter.     This was a telemedicine visit with the physician located at home.     Answers for HPI/ROS submitted by the patient on 6/22/2023  If you checked off any problems, how difficult have these problems made it for you to do your work, take care of things at home, or get along with other people?: Not difficult at all PHQ9 TOTAL SCORE: 1    The EMR was reviewed on the day of the visit to review my most recent note and the intervening events since the last visit. Pertinent information from that review includes the following: no additional visits since last seen    Today, Hawk Wiggins presents on his own.     He's enjoying his summer vacation. \"No complaints.\" Interviewing for a new " "job as a  in a [employer]. 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 He's working part time there and is struggling to get more hours \"but my manager is ignoring me when I ask for more hours.\"    Hwak is saving and paying his mother back. He'd like to have some spending money for food and necessities.     \"I've been trying to work a lot more on self-improvement. My biggest blockade is getting comfortable about not being in a relationship. I'm hoping I can get to a place where I can stop worrying about that. I'm working on realizing that it's okay to go on your own path.\"     On inquiry, Hawk shared, \"I've realized I was deprived of social connection. (He's not clear if this is both family and friends and exactly where this challenges are coming from.) When I finally had that connection, the flood villalta opened up. I'm working on managing it as a more healthy flow.\"     \"I think part of me just missing that type of connection.\"    As he expands his social connection, he's finding this is a useful and helps him feel comforted, valued, and heard. He thinks it \"will take a bit\" to figure out and comfortably accept his own inherent value. \"For myself it's about finding a kind of inner peace. The only way to continue that is to be able to understand in this life you'll form hundreds of thousands of social connections and you'll need to figure out to value each of them.\"    His moods fluctuates a bit depending on how he's feeling about social connection. Therapy is good and, right now, is helping with family communication.     Next visit is August 17 29 minutes spent on the date of the encounter doing chart review, history and exam, documentation and further activities per the note    "

## 2023-06-22 NOTE — NURSING NOTE
Is the patient currently in the state of [place]? YES    Visit mode:VIDEO    If the visit is dropped, the patient can be reconnected by: TELEPHONE VISIT: Phone number: [phone]    Will anyone else be joining the visit? NO      How would you like to obtain your AVS? MyChart    Are changes needed to the allergy or medication list? NO    Reason for visit: Video Visit (Recheck)

## 2023-06-22 NOTE — LETTER
"  6/22/2023      RE: Hawk Wiggins  [address]  [place] [facility] [address]     Dear Colleague,    Thank you for referring your patient, Hawk Wiggins, to the [facility]. Please see a copy of my visit note below.    ASSESSMENT:   1. ADHD, combined type, relapse of symptoms in 2022.  2. Anxiety, periodic compulsions and obsessions; in remission.   3. Major depressive disorder, single episode, moderate.     RECOMMENDATIONS:   1. Diagnostic:  No new diagnostic studies.    2. Education:  At the [employer] undergraduate program. Pursuing a degree in marketing.   3. Diet:  Encourage nutritious diet.  4. Sleep:  Encourage regular sleep schedule.   5. Physical activity: Going to the gym 6 days a week. This is really mood stabilizing for him.  6. Medication: Continue ecitalopram 20 mg daily.  7. Self-regulation:  No changes.   8. Behavior modification: Continue to incorporate health-promoting behaviors.   9. Therapy: Individual therapy about monthly. Dulce Maria who is at [facility]. Not able to participate in full therapy because of competing demands on his time.  10. Work: Working part time. Trying to secure full time work as a .  11. Follow-up: Monthly.     Hawk Wiggins is a 22year 10month old last seen on May 25.   Estimated body mass index is 20.64 kg/m  as calculated from the following:    Height as of 3/5/20: 5' 9.02\" (175.3 cm).    Weight as of 7/7/22: 139 lb 12.8 oz (63.4 kg).   No height and weight on file for this encounter.     This was a telemedicine visit with the physician located at home.     Answers for HPI/ROS submitted by the patient on 6/22/2023  If you checked off any problems, how difficult have these problems made it for you to do your work, take care of things at home, or get along with other people?: Not difficult at all PHQ9 TOTAL SCORE: 1    The EMR was reviewed on the day of the visit to review my most recent note and the intervening " "events since the last visit. Pertinent information from that review includes the following: no additional visits since last seen    Today, Hawk S Emmons presents on his own.     He's enjoying his summer vacation. \"No complaints.\" Interviewing for a new job as a  in a [employer]. 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 He'd like to have some spending money for food and necessities.     \"I've been trying to work a lot more on self-improvement. My biggest blockade is getting comfortable about not being in a relationship. I'm hoping I can get to a place where I can stop worrying about that. I'm working on realizing that it's okay to go on your own path.\"     On inquiry, Hawk shared, \"I've realized I was deprived of social connection. (He's not clear if this is both family and friends and exactly where this challenges are coming from.) When I finally had that connection, the flood villalta opened up. I'm working on managing it as a more healthy flow.\"     \"I think part of me just missing that type of connection.\"    As he expands his social connection, he's finding this is a useful and helps him feel comforted, valued, and heard. He thinks it \"will take a bit\" to figure out and comfortably accept his own inherent value. \"For myself it's about finding a kind of inner peace. The only way to continue that is to be able to understand in this life you'll form hundreds of thousands of social connections and you'll need to figure out to value each of them.\"    His moods fluctuates a bit depending on how he's feeling about social connection. Therapy is good and, right now, is helping with family communication.     Next visit is August 17 29 minutes spent on the date of the " encounter doing chart review, history and exam, documentation and further activities per the note      Sincerely,    Crystal Chirinos MD

## 2023-06-22 NOTE — PATIENT INSTRUCTIONS
**For crisis resources, please see the information at the end of this document**   Patient Education    Thank you for coming to the [facility].     Lab Testing:  If you had lab testing today and your results are reassuring or normal they will be mailed to you or sent through [employer] within 7 days. If the lab tests need quick action we will call you with the results. The phone number we will call with results is # [phone]. If this is not the best number please call our clinic and change the number.     Medication Refills:  If you need any refills please call your pharmacy and they will contact us. Our fax number for refills is [phone].   Three business days of notice are needed for general medication refill requests.   Five business days of notice are needed for controlled substance refill requests.   If you need to change to a different pharmacy, please contact the new pharmacy directly. The new pharmacy will help you get your medications transferred.     Contact Us:  Please call [phone] during business hours (8-5:00 M-F).   If you have medication related questions after clinic hours, or on the weekend, please call [phone].     Financial Assistance [phone]   Medical Records [phone]       MENTAL HEALTH CRISIS RESOURCES:  For a emergency help, please call 911 or go to the nearest Emergency Department.     Emergency Walk-In Options:   [facility] Unit @ [facility] ([place]): [phone] - Specialized mental health emergency area designed to be calming  [facility] ([place]): [phone]  [facility] ([place]): [phone]  [facility]): [phone]    [facility] Crisis Information:   [place]: [phone]  [place]: [phone]  [place] ([place]) - Adult: [phone]     Child: [phone]  [place] - Adult: [phone]     Child: [phone]  [place]: [phone]  List of all [place]  [facility] resources:   https://[place].gov/dhs/people-we-serve/adults/health-care/mental-health/resources/crisis-contacts.jsp    National Crisis Information:   Crisis Text Line: Text  [place]  to 361759  Suicide & Crisis Lifeline: 988  National Suicide Prevention Lifeline: 1-943-993-TALK (5-997-801-5922)       For online chat options, visit https://suicidepreventionlifeline.org/chat/  Poison Control Center: 6-293-211-1325  Trans Lifeline: 4-038-438-8728 - Hotline for transgender people of all ages  The [employer] Project: 9-750-072-1955 - Hotline for LGBT youth     For Non-Emergency Support:   Fast Tracker: Mental Health & Substance Use Disorder Resources -   https://www.[place].org/

## 2023-06-22 NOTE — PROGRESS NOTES
"Virtual Visit Details    Type of service:  Video Visit     Originating Location (pt. Location): {video visit patient location:287787::\"Home\"}  {PROVIDER LOCATION On-site should be selected for visits conducted from your clinic location or adjoining [facility] hospital, academic office, or other nearby [facility] building. Off-site should be selected for all other provider locations, including home:704748}  Distant Location (provider location):  {virtual location provider:396296}  Platform used for Video Visit: {Virtual Visit Platforms:271677::\"[employer]\"}  "

## 2023-08-17 ENCOUNTER — VIRTUAL VISIT (OUTPATIENT)
Dept: PEDIATRICS | Facility: CLINIC | Age: 23
End: 2023-08-17
Payer: COMMERCIAL

## 2023-08-17 DIAGNOSIS — F41.9 ANXIETY: Primary | ICD-10-CM

## 2023-08-17 DIAGNOSIS — F32.1 CURRENT MODERATE EPISODE OF MAJOR DEPRESSIVE DISORDER WITHOUT PRIOR EPISODE (H): ICD-10-CM

## 2023-08-17 PROCEDURE — 99214 OFFICE O/P EST MOD 30 MIN: CPT | Mod: VID | Performed by: PEDIATRICS

## 2023-08-17 NOTE — LETTER
"  8/17/2023      RE: Hawk Wiggins  [address]  [place] [address]     Dear Colleague,    Thank you for referring your patient, Hawk Wiggins, to the [facility]. Please see a copy of my visit note below.    ASSESSMENT:   1. ADHD, combined type, relapse of symptoms in 2022.  2. Anxiety, periodic compulsions and obsessions; in remission.   3. Major depressive disorder, single episode, moderate.     RECOMMENDATIONS:   1. Diagnostic:  No new diagnostic studies.    2. Education:  At the [employer] undergraduate program. Pursuing a degree in marketing.   3. Diet:  Encourage nutritious diet.  4. Sleep:  Encourage regular sleep schedule.   5. Physical activity: Going to the gym 6 days a week. This is really mood stabilizing for him.  6. Medication: Continue ecitalopram 20 mg daily. Self-treating with THC/CBD.  7. Self-regulation:  No changes.   8. Behavior modification: Continue to incorporate health-promoting behaviors.   9. Therapy: Individual therapy about monthly. Dulce Maria who is at [facility]. Not able to participate in full therapy because of competing demands on his time.  10. Work: Working part time. Trying to secure full time work as a .  11. Follow-up: Monthly.     Hawk Wiggins is a 23year 0month old last seen on June 22.   Estimated body mass index is 20.64 kg/m  as calculated from the following:    Height as of 3/5/20: 5' 9.02\" (175.3 cm).    Weight as of 7/7/22: 139 lb 12.8 oz (63.4 kg).   No height and weight on file for this encounter.     This was a telemedicine visit with the physician located at home.     The EMR was reviewed on the day of the visit to review my most recent note and the intervening events since the last visit. Pertinent information from that review includes the following: No additional visits since last seen.    Today, I visited with Hawk alone. Today, he wanted to chat about getting better \"about day to day " "practices\" and \"getting ready for the school year\" and the \"importance of self-meditation.\" Recently, an issue came up at work that \"pushed the limits of my anxiety\" and this led to some experimentation with THC and CBD. He's noticed some beneficial effects on mood and anxiety.     Patient information review completed and sent results via [employer].     Topics for today: mood, school, therapy  Tasks for today: check med renewal    Next visit is 9/14, 10/26     36 minutes spent on the date of the encounter doing chart review, history and exam, documentation and further activities per the note      Again, thank you for allowing me to participate in the care of your patient.      Sincerely,    Crystal Chirinos MD  "

## 2023-08-17 NOTE — PROGRESS NOTES
"ASSESSMENT:   1. ADHD, combined type, relapse of symptoms in 2022.  2. Anxiety, periodic compulsions and obsessions; in remission.   3. Major depressive disorder, single episode, moderate.     RECOMMENDATIONS:   1. Diagnostic:  No new diagnostic studies.    2. Education:  At the [employer] undergraduate program. Pursuing a degree in marketing.   3. Diet:  Encourage nutritious diet.  4. Sleep:  Encourage regular sleep schedule.   5. Physical activity: Going to the gym 6 days a week. This is really mood stabilizing for him.  6. Medication: Continue ecitalopram 20 mg daily. Self-treating with THC/CBD.  7. Self-regulation:  No changes.   8. Behavior modification: Continue to incorporate health-promoting behaviors.   9. Therapy: Individual therapy about monthly. Dulce Maria who is at [facility] [place]. Not able to participate in full therapy because of competing demands on his time.  10. Work: Working part time. Trying to secure full time work as a .  11. Follow-up: Monthly.     Hawk Wiggins is a 23year 0month old last seen on June 22.   Estimated body mass index is 20.64 kg/m  as calculated from the following:    Height as of 3/5/20: 5' 9.02\" (175.3 cm).    Weight as of 7/7/22: 139 lb 12.8 oz (63.4 kg).   No height and weight on file for this encounter.     This was a telemedicine visit with the physician located at home.     The EMR was reviewed on the day of the visit to review my most recent note and the intervening events since the last visit. Pertinent information from that review includes the following: No additional visits since last seen.    Today, I visited with Hawk alone. Today, he wanted to chat about getting better \"about day to day practices\" and \"getting ready for the school year\" and the \"importance of self-meditation.\" Recently, an issue came up at work that \"pushed the limits of my anxiety\" and this led to some experimentation with THC and CBD. He's noticed some beneficial effects " on mood and anxiety.     Patient information review completed and sent results via [employer].     Topics for today: mood, school, therapy  Tasks for today: check med renewal    Next visit is 9/14, 10/26     36 minutes spent on the date of the encounter doing chart review, history and exam, documentation and further activities per the note

## 2023-08-17 NOTE — NURSING NOTE
Is the patient currently in the state of [place]? YES    Visit mode:VIDEO    If the visit is dropped, the patient can be reconnected by: VIDEO VISIT: Text to cell phone:   Telephone Information:   Mobile [phone]       Will anyone else be joining the visit? NO  (If patient encounters technical issues they should call [phone])    How would you like to obtain your AVS? MyChart    Are changes needed to the allergy or medication list? No    Reason for visit: RECHECK    Clara WOODSF

## 2023-08-17 NOTE — PROGRESS NOTES
"Virtual Visit Details    Type of service:  Video Visit     Originating Location (pt. Location): {video visit patient location:233597::\"Home\"}  {PROVIDER LOCATION On-site should be selected for visits conducted from your clinic location or adjoining [facility] hospital, academic office, or other nearby [facility] building. Off-site should be selected for all other provider locations, including home:892895}  Distant Location (provider location):  {virtual location provider:872119}  Platform used for Video Visit: {Virtual Visit Platforms:907211::\"[employer]\"}    "

## 2023-09-17 ENCOUNTER — HEALTH MAINTENANCE LETTER (OUTPATIENT)
Age: 23
End: 2023-09-17

## 2023-10-26 ENCOUNTER — VIRTUAL VISIT (OUTPATIENT)
Dept: PEDIATRICS | Facility: CLINIC | Age: 23
End: 2023-10-26
Payer: COMMERCIAL

## 2023-10-26 VITALS — BODY MASS INDEX: 21.05 KG/M2 | HEIGHT: 70 IN | WEIGHT: 147 LBS

## 2023-10-26 DIAGNOSIS — F32.1 CURRENT MODERATE EPISODE OF MAJOR DEPRESSIVE DISORDER WITHOUT PRIOR EPISODE (H): ICD-10-CM

## 2023-10-26 DIAGNOSIS — F41.9 ANXIETY: ICD-10-CM

## 2023-10-26 DIAGNOSIS — F90.2 ATTENTION DEFICIT HYPERACTIVITY DISORDER, COMBINED TYPE: Primary | ICD-10-CM

## 2023-10-26 PROCEDURE — 99215 OFFICE O/P EST HI 40 MIN: CPT | Mod: VID | Performed by: PEDIATRICS

## 2023-10-26 RX ORDER — ESCITALOPRAM OXALATE 20 MG/1
20 TABLET ORAL DAILY
Qty: 90 TABLET | Refills: 3 | Status: SHIPPED | OUTPATIENT
Start: 2023-10-26 | End: 2023-12-14 | Stop reason: ALTCHOICE

## 2023-10-26 ASSESSMENT — PATIENT HEALTH QUESTIONNAIRE - PHQ9
10. IF YOU CHECKED OFF ANY PROBLEMS, HOW DIFFICULT HAVE THESE PROBLEMS MADE IT FOR YOU TO DO YOUR WORK, TAKE CARE OF THINGS AT HOME, OR GET ALONG WITH OTHER PEOPLE: SOMEWHAT DIFFICULT
SUM OF ALL RESPONSES TO PHQ QUESTIONS 1-9: 6
SUM OF ALL RESPONSES TO PHQ QUESTIONS 1-9: 6

## 2023-10-26 NOTE — LETTER
"  10/26/2023      RE: Hawk Wiggins  [address]  [place] [address]     Dear Colleague,    Thank you for referring your patient, Hawk Wiggins, to the [facility]. Please see a copy of my visit note below.        ASSESSMENT:   1. ADHD, combined type, relapse of symptoms in 2022.  2. Anxiety, periodic compulsions and obsessions; in remission.   3. Major depressive disorder, single episode, moderate.     RECOMMENDATIONS:   1. Diagnostic:  No new diagnostic studies.    2. Education:  At the [employer] undergraduate program. Pursuing a degree in marketing.   3. Diet:  Encourage nutritious diet.  4. Sleep:  Encourage regular sleep schedule.   5. Physical activity: Going to the gym 6 days a week. This is really mood stabilizing for him.  6. Medication: Continue ecitalopram 20 mg daily. Self-treating with THC/CBD.  7. Self-regulation:  No changes.   8. Behavior modification: Continue to incorporate health-promoting behaviors.   9. Therapy: Individual therapy about monthly. Dulce Maria who is at [facility] [place]. Not able to participate in full therapy because of competing demands on his time.  10. Work: Working part time. Trying to secure full time work as a .  11. Follow-up: Monthly.           Hawk Wiggins is a 23year 2month old last seen on August 17.   Estimated body mass index is 20.64 kg/m  as calculated from the following:    Height as of 3/5/20: 5' 9.02\" (175.3 cm).    Weight as of 7/7/22: 139 lb 12.8 oz (63.4 kg).   No height and weight on file for this encounter.     This was a telemedicine visit with the physician located at home.     The EMR was reviewed on the day of the visit to review my most recent note and the intervening events since the last visit. Pertinent information from that review includes the following: No additional visits since last seen.    Today, I visited with Hawk alone.     \"I've been on some real growth processes and it's " "been a real roller coaster.\" After the summer, he spent a lot of time reflecting, and talking with his friends. He came to some conclusions about how to \"deal with the pain and stress.\"     Hawk concluded that the missing ingredient from his life was the absence of a romantic relationship. He also became clear that he was seeking lots of social connection. However, he's still struggling to find a committed long-term relationship. \"Now, I'm stuck in this position where I'm stuck between these two worlds. I need to work on myself. And, part of me thinks that work is about finding a committed relationship.\"     Hawk is feeling a lot of time pressure to find a long term partner while he's in college. However, he continues to be disappointed in the interactions that he has with potential partners.     Discussed the developmental disconnect between the kind of relationships he is interested in, his peer group, and his developmental independence overall.     Topics for today: mood, medication, therapy, work, school  Tasks for today: renew medication    Next visit is 11/30, 12/14     42 minutes spent on the date of the encounter doing chart review, history and exam, documentation and further activities per the note    Answers submitted by the patient for this visit:  Patient Health Questionnaire (Submitted on 10/26/2023)  If you checked off any problems, how difficult have these problems made it for you to do your work, take care of things at home, or get along with other people?: Somewhat difficult  PHQ9 TOTAL SCORE: 6      Again, thank you for allowing me to participate in the care of your patient.      Sincerely,    Crystal Chirinos MD  "

## 2023-10-26 NOTE — PROGRESS NOTES
"    ASSESSMENT:   1. ADHD, combined type, relapse of symptoms in 2022.  2. Anxiety, periodic compulsions and obsessions; in remission.   3. Major depressive disorder, single episode, moderate.     RECOMMENDATIONS:   1. Diagnostic:  No new diagnostic studies.    2. Education:  At the [employer] undergraduate program. Pursuing a degree in marketing.   3. Diet:  Encourage nutritious diet.  4. Sleep:  Encourage regular sleep schedule.   5. Physical activity: Going to the gym 6 days a week. This is really mood stabilizing for him.  6. Medication: Continue ecitalopram 20 mg daily. Self-treating with THC/CBD.  7. Self-regulation:  No changes.   8. Behavior modification: Continue to incorporate health-promoting behaviors.   9. Therapy: Individual therapy about monthly. Dulce Maria who is at [facility] [place]. Not able to participate in full therapy because of competing demands on his time.  10. Work: Working part time. Trying to secure full time work as a .  11. Follow-up: Monthly.           Hawk Wiggins is a 23year 2month old last seen on August 17.   Estimated body mass index is 20.64 kg/m  as calculated from the following:    Height as of 3/5/20: 5' 9.02\" (175.3 cm).    Weight as of 7/7/22: 139 lb 12.8 oz (63.4 kg).   No height and weight on file for this encounter.     This was a telemedicine visit with the physician located at home.     The EMR was reviewed on the day of the visit to review my most recent note and the intervening events since the last visit. Pertinent information from that review includes the following: No additional visits since last seen.    Today, I visited with Hawk alone.     \"I've been on some real growth processes and it's been a real roller coaster.\" After the summer, he spent a lot of time reflecting, and talking with his friends. He came to some conclusions about how to \"deal with the pain and stress.\"     Hawk concluded that the missing ingredient from his life was the " "absence of a romantic relationship. He also became clear that he was seeking lots of social connection. However, he's still struggling to find a committed long-term relationship. \"Now, I'm stuck in this position where I'm stuck between these two worlds. I need to work on myself. And, part of me thinks that work is about finding a committed relationship.\"     Hawk is feeling a lot of time pressure to find a long term partner while he's in college. However, he continues to be disappointed in the interactions that he has with potential partners.     Discussed the developmental disconnect between the kind of relationships he is interested in, his peer group, and his developmental independence overall.     Topics for today: mood, medication, therapy, work, school  Tasks for today: renew medication    Next visit is 11/30, 12/14     42 minutes spent on the date of the encounter doing chart review, history and exam, documentation and further activities per the note    Answers submitted by the patient for this visit:  Patient Health Questionnaire (Submitted on 10/26/2023)  If you checked off any problems, how difficult have these problems made it for you to do your work, take care of things at home, or get along with other people?: Somewhat difficult  PHQ9 TOTAL SCORE: 6    "

## 2023-10-26 NOTE — NURSING NOTE
Is the patient currently in the state of [place]? YES    Visit mode:VIDEO    If the visit is dropped, the patient can be reconnected by: VIDEO VISIT: Text to cell phone:   Telephone Information:   Mobile [phone]       Will anyone else be joining the visit? NO  (If patient encounters technical issues they should call [phone])    How would you like to obtain your AVS? MyChart    Are changes needed to the allergy or medication list? No    Reason for visit: RECHECK    Charla GALVAN

## 2023-11-30 ENCOUNTER — VIRTUAL VISIT (OUTPATIENT)
Dept: PEDIATRICS | Facility: CLINIC | Age: 23
End: 2023-11-30
Payer: COMMERCIAL

## 2023-11-30 DIAGNOSIS — F32.1 CURRENT MODERATE EPISODE OF MAJOR DEPRESSIVE DISORDER WITHOUT PRIOR EPISODE (H): ICD-10-CM

## 2023-11-30 DIAGNOSIS — F41.9 ANXIETY: Primary | ICD-10-CM

## 2023-11-30 PROCEDURE — 99215 OFFICE O/P EST HI 40 MIN: CPT | Mod: VID | Performed by: PEDIATRICS

## 2023-11-30 ASSESSMENT — PATIENT HEALTH QUESTIONNAIRE - PHQ9
SUM OF ALL RESPONSES TO PHQ QUESTIONS 1-9: 11
SUM OF ALL RESPONSES TO PHQ QUESTIONS 1-9: 11
10. IF YOU CHECKED OFF ANY PROBLEMS, HOW DIFFICULT HAVE THESE PROBLEMS MADE IT FOR YOU TO DO YOUR WORK, TAKE CARE OF THINGS AT HOME, OR GET ALONG WITH OTHER PEOPLE: SOMEWHAT DIFFICULT

## 2023-11-30 ASSESSMENT — PAIN SCALES - GENERAL: PAINLEVEL: NO PAIN (0)

## 2023-11-30 NOTE — PROGRESS NOTES
"ASSESSMENT:  ADHD, combined type. Currently off medication.  Generalized anxiety.  Major depressive disorder, single episode, moderate.    PLAN:   Diagnostic: No new diagnostic studies at this time.  Behavior management: Continue to utilize healthy routines.  Sleep: Monitor.  Physical Activity: Going to the gym regularly.  Nutrition: Healthy nourishment.  Relationships: Explore groups, clubs, and activities. Explore the unique kia of solitude.   Creative expression: Creativity, natural world, non-human animal connection, giving to others, and physical activity can be avenues to deeply enjoying episodes of solitude.  Medication: Ecitalopram 20 mg daily.  Education: At [employer], pursuing a degree in marketing.  [facility] Collaboration: None.  Therapy: Individual therapy monthly with Dulce Maria at [facility] in [place]. Not able to participate in full therapy due to competing priorities.  Rehabilitation: None.  Community supports: None.   Follow-up: Monthly.    Hawk Wiggins is a 23year 3month old last seen on October 26.   Estimated body mass index is 21.09 kg/m  as calculated from the following:    Height as of 10/26/23: 5' 10\" (177.8 cm).    Weight as of 10/26/23: 147 lb (66.7 kg).   No height and weight on file for this encounter.     This was a telemedicine visit with the physician located at home.     The EMR was reviewed on the day of the visit to review my most recent note and the intervening events since the last visit. Pertinent information from that review includes the following: No additional visits since last seen.    Today, I visited with Hawk on his own. Today, he said he was \"mixed.\" He went to [place] for a week over the Thanksgiving holiday. He was walking in the woods and \"got lost in my thoughts.\" He had a recent conversation with a friend about thoughts, emotions, and the state of the world. He is discovering he's a social connector and enjoys being social with other. Recently, he's felt really lonely because he's having " trouble making friends and connections. He finds this frustrating. He is wondering if he needs to be comfortable on his own.     Topics for today: school, activities, mood  Tasks for today: check med renewal    Next visit is 12/14, 2/15     42 minutes spent on the date of the encounter doing chart review, history and exam, documentation and further activities per the note      Answers submitted by the patient for this visit:  Patient Health Questionnaire (Submitted on 11/30/2023)  If you checked off any problems, how difficult have these problems made it for you to do your work, take care of things at home, or get along with other people?: Somewhat difficult  PHQ9 TOTAL SCORE: 11

## 2023-11-30 NOTE — NURSING NOTE
Is the patient currently in the state of [place]? YES    Visit mode:VIDEO    If the visit is dropped, the patient can be reconnected by: VIDEO VISIT: Text to cell phone:   Telephone Information:   Mobile [phone]       Will anyone else be joining the visit? NO  (If patient encounters technical issues they should call [phone]    How would you like to obtain your AVS? MyChart    Are changes needed to the allergy or medication list? No    Reason for visit: OSORIO GALVAN

## 2023-11-30 NOTE — LETTER
"  11/30/2023      RE: Hawk Wiggins  [address]  [place] [address]     Dear Colleague,    Thank you for referring your patient, Hawk Wiggins, to the [facility]. Please see a copy of my visit note below.    ASSESSMENT:  ADHD, combined type. Currently off medication.  Generalized anxiety.  Major depressive disorder, single episode, moderate.    PLAN:   Diagnostic: No new diagnostic studies at this time.  Behavior management: Continue to utilize healthy routines.  Sleep: Monitor.  Physical Activity: Going to the gym regularly.  Nutrition: Healthy nourishment.  Relationships: Explore groups, clubs, and activities. Explore the unique kia of solitude.   Creative expression: Creativity, natural world, non-human animal connection, giving to others, and physical activity can be avenues to deeply enjoying episodes of solitude.  Medication: Ecitalopram 20 mg daily.  Education: At [employer], pursuing a degree in marketing.  [facility] Collaboration: None.  Therapy: Individual therapy monthly with Dulce Maria at [facility] in [place]. Not able to participate in full therapy due to competing priorities.  Rehabilitation: None.  Community supports: None.   Follow-up: Monthly.    Hawk Wiggins is a 23year 3month old last seen on October 26.   Estimated body mass index is 21.09 kg/m  as calculated from the following:    Height as of 10/26/23: 5' 10\" (177.8 cm).    Weight as of 10/26/23: 147 lb (66.7 kg).   No height and weight on file for this encounter.     This was a telemedicine visit with the physician located at home.     The EMR was reviewed on the day of the visit to review my most recent note and the intervening events since the last visit. Pertinent information from that review includes the following: No additional visits since last seen.    Today, I visited with Hawk on his own. Today, he said he was \"mixed.\" He went to [place] for a week over the Thanksgiving holiday. He was walking in the woods and " "\"got lost in my thoughts.\" He had a recent conversation with a friend about thoughts, emotions, and the state of the world. He is discovering he's a social connector and enjoys being social with other. Recently, he's felt really lonely because he's having trouble making friends and connections. He finds this frustrating. He is wondering if he needs to be comfortable on his own.     Topics for today: school, activities, mood  Tasks for today: check med renewal    Next visit is 12/14, 2/15     42 minutes spent on the date of the encounter doing chart review, history and exam, documentation and further activities per the note      Answers submitted by the patient for this visit:  Patient Health Questionnaire (Submitted on 11/30/2023)  If you checked off any problems, how difficult have these problems made it for you to do your work, take care of things at home, or get along with other people?: Somewhat difficult  PHQ9 TOTAL SCORE: 11      Again, thank you for allowing me to participate in the care of your patient.      Sincerely,    Crystal Chirinos MD  "

## 2023-12-04 ENCOUNTER — TELEPHONE (OUTPATIENT)
Dept: PEDIATRICS | Facility: CLINIC | Age: 23
End: 2023-12-04
Payer: COMMERCIAL

## 2023-12-04 NOTE — TELEPHONE ENCOUNTER
"[facility] Call Center    Phone Message    May a detailed message be left on voicemail: yes     Reason for Call: Symptoms or Concerns     If patient has red-flag symptoms, warm transfer to triage line    Current symptom or concern: Mom called over recent concerns over Pt's recent mental health. They stated that has been \"going down hill and is not suicidal, but are expressing an increase of hopelessness\". They are requesting a call back as soon as possible from a member of provider's care team. They would like to discuss options for a medication change, if provider thinks that would be a good option. An appointment is scheduled for 12/14/23 and no sooner availability was available, but that appointment was added to the cancellation list.    Has patient previously been seen for this? Yes    By :   Crystal Chirinos MD       Date: last seen on 11/30/23     Are there any new or worsening symptoms? Yes:     Action Taken: Other: p midb  db    Travel Screening: Not Applicable                                                                   "

## 2023-12-04 NOTE — TELEPHONE ENCOUNTER
I'd be happy to talk to Hawk about it next week. And, if he'd like his mother to attend to discuss this, she would be welcome to join us. Estrellita

## 2023-12-05 NOTE — TELEPHONE ENCOUNTER
A return phone call was relayed to the mother of Hawk Wiggins today (12/05/23) at 10:06 AM and a detailed message left on an identified voicemail.  At this time, symptoms and any possible medication changes will be discussed at Hawk's upcoming appointment on 12/14.  Dr Chirinos is happy to work with Ghazal on incorporating her into his upcoming visit, and we encourage Ghazal to speak with Hawk ahead of time regarding her attendance at his appointment on 12/14.  If Ghazal has any additional information, questions or concerns to relay to Dr Chirinos in this interim period, we welcome her to reach out to us by telephone or MyChart.  We additionally are advising Hawk utilize crisis resources should his mood/depression symptoms worsen, and to seek immediate care in the emergency room if at any time he expresses thoughts of self harm or suicide with a plan.    Brigid Goodson RN

## 2023-12-14 ENCOUNTER — VIRTUAL VISIT (OUTPATIENT)
Dept: PEDIATRICS | Facility: CLINIC | Age: 23
End: 2023-12-14
Payer: COMMERCIAL

## 2023-12-14 VITALS — BODY MASS INDEX: 21.67 KG/M2 | WEIGHT: 151 LBS

## 2023-12-14 DIAGNOSIS — F32.1 CURRENT MODERATE EPISODE OF MAJOR DEPRESSIVE DISORDER WITHOUT PRIOR EPISODE (H): ICD-10-CM

## 2023-12-14 DIAGNOSIS — F90.2 ATTENTION DEFICIT HYPERACTIVITY DISORDER, COMBINED TYPE: Primary | ICD-10-CM

## 2023-12-14 PROCEDURE — 99215 OFFICE O/P EST HI 40 MIN: CPT | Mod: VID | Performed by: PEDIATRICS

## 2023-12-14 RX ORDER — METHYLPHENIDATE HYDROCHLORIDE EXTENDED RELEASE 10 MG/1
10 TABLET ORAL DAILY
Qty: 30 TABLET | Refills: 0 | Status: SHIPPED | OUTPATIENT
Start: 2023-12-14 | End: 2024-01-13

## 2023-12-14 ASSESSMENT — PATIENT HEALTH QUESTIONNAIRE - PHQ9
SUM OF ALL RESPONSES TO PHQ QUESTIONS 1-9: 14
SUM OF ALL RESPONSES TO PHQ QUESTIONS 1-9: 14
10. IF YOU CHECKED OFF ANY PROBLEMS, HOW DIFFICULT HAVE THESE PROBLEMS MADE IT FOR YOU TO DO YOUR WORK, TAKE CARE OF THINGS AT HOME, OR GET ALONG WITH OTHER PEOPLE: NOT DIFFICULT AT ALL

## 2023-12-14 ASSESSMENT — PAIN SCALES - GENERAL: PAINLEVEL: NO PAIN (0)

## 2023-12-14 NOTE — NURSING NOTE
Is the patient currently in the state of [place]? YES    Visit mode:VIDEO    If the visit is dropped, the patient can be reconnected by: VIDEO VISIT: Text to cell phone:   Telephone Information:   Mobile [phone]       Will anyone else be joining the visit? Yes: How would they like to receive their invite Text to cell phone: [phone]  (If patient encounters technical issues they should call [phone]) [phone]    How would you like to obtain your AVS? MyChart    Are changes needed to the allergy or medication list? No    Rooming Documentation: Assigned questionnaire(s) completed .    Reason for visit: COLE Patiño

## 2023-12-14 NOTE — LETTER
"  12/14/2023      RE: Hawk Wiggins  [address]  [place] [address]     Dear Colleague,    Thank you for referring your patient, Hawk Wiggins, to the [facility]. Please see a copy of my visit note below.    ASSESSMENT:  ADHD, combined type. Currently off medication.  Generalized anxiety.  Major depressive disorder, single episode, moderate.     PLAN:   Diagnostic: No new diagnostic studies at this time.  Behavior management: Continue to utilize healthy routines.  Sleep: Monitor.  Physical Activity: Going to the gym regularly.  Nutrition: Healthy nourishment.  Relationships: Explore groups, clubs, and activities. Explore the unique kia of solitude.   Creative expression: Creativity, natural world, non-human animal connection, giving to others, and physical activity can be avenues to deeply enjoying episodes of solitude.  Medication: Discontinue ecitalopram 20 mg daily. Start sertraline 50 mg daily. Start Methylin ER 10 mg daily for inattention.   Education: At [employer], pursuing a degree in marketing.  [facility] Collaboration: None.  Therapy: Agree with increase in therapy frequency to weekly.   Rehabilitation: None.  Community supports: None.   Follow-up: Monthly.    Hawk Wiggins is a 23year 4month old last seen on November 30.   Estimated body mass index is 21.09 kg/m  as calculated from the following:    Height as of 10/26/23: 5' 10\" (177.8 cm).    Weight as of 10/26/23: 147 lb (66.7 kg).   No height and weight on file for this encounter.     This was a telemedicine visit with the physician located at home.     The EMR was reviewed on the day of the visit to review my most recent note and the intervening events since the last visit. Pertinent information from that review includes the following: Maternal phone call on December 4 with concerns for Hawk's mental health    Today, I visited with Hawk and he conferenced in his mother to talk through medication changes. In the last " "couple weeks, he's been feeling more low.     \"I've been facing a lot of struggle personally.\" \"I have a lack of interest in participating in daily activities.\" \"I think it's all a result of lack of social connection.\" \"It's nothing suicidal or anything like that.\" \"I'm trying to think of strategies of coping without going overboard.\"     He's also having a lot of difficulty concentrating, in particular, on his schoolwork. Would like a trial of a 4-6 hour medium-acting methylphenidate.     Topics for today: connecting, mood  Tasks for today: sertraline    Next visit is 2/15, 3/21     42 minutes spent on the date of the encounter doing chart review, history and exam, documentation and further activities per the note    Answers submitted by the patient for this visit:  Patient Health Questionnaire (Submitted on 12/14/2023)  If you checked off any problems, how difficult have these problems made it for you to do your work, take care of things at home, or get along with other people?: Not difficult at all  PHQ9 TOTAL SCORE: 14      Again, thank you for allowing me to participate in the care of your patient.      Sincerely,    Crystal Chirinos MD  "

## 2023-12-14 NOTE — PROGRESS NOTES
"ASSESSMENT:  ADHD, combined type. Currently off medication.  Generalized anxiety.  Major depressive disorder, single episode, moderate.     PLAN:   Diagnostic: No new diagnostic studies at this time.  Behavior management: Continue to utilize healthy routines.  Sleep: Monitor.  Physical Activity: Going to the gym regularly.  Nutrition: Healthy nourishment.  Relationships: Explore groups, clubs, and activities. Explore the unique kia of solitude.   Creative expression: Creativity, natural world, non-human animal connection, giving to others, and physical activity can be avenues to deeply enjoying episodes of solitude.  Medication: Discontinue ecitalopram 20 mg daily. Start sertraline 50 mg daily. Start Methylin ER 10 mg daily for inattention.   Education: At [employer], pursuing a degree in marketing.  [facility] Collaboration: None.  Therapy: Agree with increase in therapy frequency to weekly.   Rehabilitation: None.  Community supports: None.   Follow-up: Monthly.    Hawk Wiggins is a 23year 4month old last seen on November 30.   Estimated body mass index is 21.09 kg/m  as calculated from the following:    Height as of 10/26/23: 5' 10\" (177.8 cm).    Weight as of 10/26/23: 147 lb (66.7 kg).   No height and weight on file for this encounter.     This was a telemedicine visit with the physician located at home.     The EMR was reviewed on the day of the visit to review my most recent note and the intervening events since the last visit. Pertinent information from that review includes the following: Maternal phone call on December 4 with concerns for Hawk's mental health    Today, I visited with Hawk and he conferenced in his mother to talk through medication changes. In the last couple weeks, he's been feeling more low.     \"I've been facing a lot of struggle personally.\" \"I have a lack of interest in participating in daily activities.\" \"I think it's all a result of lack of social connection.\" \"It's nothing suicidal or anything " "like that.\" \"I'm trying to think of strategies of coping without going overboard.\"     He's also having a lot of difficulty concentrating, in particular, on his schoolwork. Would like a trial of a 4-6 hour medium-acting methylphenidate.     Topics for today: connecting, mood  Tasks for today: sertraline    Next visit is 2/15, 3/21     42 minutes spent on the date of the encounter doing chart review, history and exam, documentation and further activities per the note    Answers submitted by the patient for this visit:  Patient Health Questionnaire (Submitted on 12/14/2023)  If you checked off any problems, how difficult have these problems made it for you to do your work, take care of things at home, or get along with other people?: Not difficult at all  PHQ9 TOTAL SCORE: 14    "

## 2024-02-15 ENCOUNTER — VIRTUAL VISIT (OUTPATIENT)
Dept: PEDIATRICS | Facility: CLINIC | Age: 24
End: 2024-02-15
Payer: COMMERCIAL

## 2024-02-15 VITALS — WEIGHT: 150 LBS | BODY MASS INDEX: 21.52 KG/M2

## 2024-02-15 DIAGNOSIS — F90.2 ATTENTION DEFICIT HYPERACTIVITY DISORDER, COMBINED TYPE: Primary | ICD-10-CM

## 2024-02-15 DIAGNOSIS — F41.9 ANXIETY: ICD-10-CM

## 2024-02-15 DIAGNOSIS — F32.1 CURRENT MODERATE EPISODE OF MAJOR DEPRESSIVE DISORDER WITHOUT PRIOR EPISODE (H): ICD-10-CM

## 2024-02-15 PROCEDURE — 99215 OFFICE O/P EST HI 40 MIN: CPT | Mod: 95 | Performed by: PEDIATRICS

## 2024-02-15 RX ORDER — METHYLPHENIDATE HYDROCHLORIDE EXTENDED RELEASE 20 MG/1
20 TABLET ORAL EVERY MORNING
Qty: 30 TABLET | Refills: 0 | Status: SHIPPED | OUTPATIENT
Start: 2024-03-16 | End: 2024-04-18

## 2024-02-15 RX ORDER — METHYLPHENIDATE HYDROCHLORIDE EXTENDED RELEASE 20 MG/1
20 TABLET ORAL EVERY MORNING
Qty: 30 TABLET | Refills: 0 | Status: SHIPPED | OUTPATIENT
Start: 2024-04-15 | End: 2024-04-18

## 2024-02-15 RX ORDER — METHYLPHENIDATE HYDROCHLORIDE EXTENDED RELEASE 20 MG/1
20 TABLET ORAL EVERY MORNING
Qty: 30 TABLET | Refills: 0 | Status: SHIPPED | OUTPATIENT
Start: 2024-02-15 | End: 2024-04-18

## 2024-02-15 ASSESSMENT — PATIENT HEALTH QUESTIONNAIRE - PHQ9
SUM OF ALL RESPONSES TO PHQ QUESTIONS 1-9: 11
10. IF YOU CHECKED OFF ANY PROBLEMS, HOW DIFFICULT HAVE THESE PROBLEMS MADE IT FOR YOU TO DO YOUR WORK, TAKE CARE OF THINGS AT HOME, OR GET ALONG WITH OTHER PEOPLE: SOMEWHAT DIFFICULT
SUM OF ALL RESPONSES TO PHQ QUESTIONS 1-9: 11

## 2024-02-15 ASSESSMENT — PAIN SCALES - GENERAL: PAINLEVEL: NO PAIN (0)

## 2024-02-15 NOTE — LETTER
"  2/15/2024      RE: Hawk Wiggins  [address]  [place] [address]     Dear Colleague,    Thank you for referring your patient, Hawk Wiggins, to the [facility]. Please see a copy of my visit note below.    ASSESSMENT:  ADHD, combined type. Currently off medication.  Generalized anxiety.  Major depressive disorder, single episode, moderate.     PLAN:   Diagnostic: No new diagnostic studies at this time.  Behavior management: Continue to utilize healthy routines.  Sleep: Monitor.  Physical Activity: Going to the gym regularly.  Nutrition: Healthy nourishment.  Relationships: Explore groups, clubs, and activities. Explore the unique kia of solitude.   Creative expression: Creativity, natural world, non-human animal connection, giving to others, and physical activity can be avenues to deeply enjoying episodes of solitude.  Medication: Continue sertraline 50 mg daily.Increase Metadate ER to 20 mg daily for inattention.   Education: At [employer], pursuing a degree in marketing.  [facility] Collaboration: None.  Therapy: Weekly.  Rehabilitation: None.  Community supports: None.   Follow-up: Monthly.    Hawk Wiggins is a 23year 6month old last seen on December 14.   Estimated body mass index is 21.67 kg/m  as calculated from the following:    Height as of 10/26/23: 5' 10\" (177.8 cm).    Weight as of 12/14/23: 151 lb (68.5 kg).   No height and weight on file for this encounter.     This was a telemedicine visit with the physician located at home.     The EMR was reviewed on the day of the visit to review my most recent note and the intervening events since the last visit. Pertinent information from that review includes the following: No additional visits since last seen.    \"Eh, here and there.\" \"I made a couple mental discoveries since our last visit.\" \"I'm stuck in a social rut but I'm just trying to find that but I'm not sure what I'm supposed to be doing.\"     To address this \"I just " "tried to be as crazily productive as possible.\" Got a job, took up a new hobby.     Hawk is getting very little benefit from the MPH at the current dose.     He's becoming quite aware of \"catastrophizing\" and getting preoccupied by uncomfortable and unsettling thoughts. Discussed mindfulness, logical conclusion, deep relaxation and general health maintenance approaches.     Topics for today: response to sertraline and methylphenidate  Tasks for today: increase MPH to 20 mg, renew sertraline    Next visit is 3/21, 4/18     46 minutes spent on the date of the encounter doing chart review, history and exam, documentation and further activities per the note    Answers submitted by the patient for this visit:  Patient Health Questionnaire (Submitted on 2/15/2024)  If you checked off any problems, how difficult have these problems made it for you to do your work, take care of things at home, or get along with other people?: Somewhat difficult  PHQ9 TOTAL SCORE: 11      Again, thank you for allowing me to participate in the care of your patient.      Sincerely,    Crystal Chirinos MD  "

## 2024-02-15 NOTE — NURSING NOTE
Is the patient currently in the state of [place]? YES    Visit mode:VIDEO    If the visit is dropped, the patient can be reconnected by: VIDEO VISIT: Text to cell phone:   Telephone Information:   Mobile [phone]       Will anyone else be joining the visit? No  (If patient encounters technical issues they should call [phone])    How would you like to obtain your AVS? MyChart    Are changes needed to the allergy or medication list? No    Rooming Documentation: Assigned questionnaire(s) completed .    Reason for visit: RECHECK     COLE Vazquez

## 2024-02-15 NOTE — PROGRESS NOTES
"ASSESSMENT:  ADHD, combined type. Currently off medication.  Generalized anxiety.  Major depressive disorder, single episode, moderate.     PLAN:   Diagnostic: No new diagnostic studies at this time.  Behavior management: Continue to utilize healthy routines.  Sleep: Monitor.  Physical Activity: Going to the gym regularly.  Nutrition: Healthy nourishment.  Relationships: Explore groups, clubs, and activities. Explore the unique kia of solitude.   Creative expression: Creativity, natural world, non-human animal connection, giving to others, and physical activity can be avenues to deeply enjoying episodes of solitude.  Medication: Continue sertraline 50 mg daily.Increase Metadate ER to 20 mg daily for inattention.   Education: At [employer], pursuing a degree in marketing.  [facility] Collaboration: None.  Therapy: Weekly.  Rehabilitation: None.  Community supports: None.   Follow-up: Monthly.    Hawk Wiggins is a 23year 6month old last seen on December 14.   Estimated body mass index is 21.67 kg/m  as calculated from the following:    Height as of 10/26/23: 5' 10\" (177.8 cm).    Weight as of 12/14/23: 151 lb (68.5 kg).   No height and weight on file for this encounter.     This was a telemedicine visit with the physician located at home.     The EMR was reviewed on the day of the visit to review my most recent note and the intervening events since the last visit. Pertinent information from that review includes the following: No additional visits since last seen.    \"Eh, here and there.\" \"I made a couple mental discoveries since our last visit.\" \"I'm stuck in a social rut but I'm just trying to find that but I'm not sure what I'm supposed to be doing.\"     To address this \"I just tried to be as crazily productive as possible.\" Got a job, took up a new hobby.     Hawk is getting very little benefit from the MPH at the current dose.     He's becoming quite aware of \"catastrophizing\" and getting preoccupied by uncomfortable and " unsettling thoughts. Discussed mindfulness, logical conclusion, deep relaxation and general health maintenance approaches.     Topics for today: response to sertraline and methylphenidate  Tasks for today: increase MPH to 20 mg, renew sertraline    Next visit is 3/21, 4/18     46 minutes spent on the date of the encounter doing chart review, history and exam, documentation and further activities per the note    Answers submitted by the patient for this visit:  Patient Health Questionnaire (Submitted on 2/15/2024)  If you checked off any problems, how difficult have these problems made it for you to do your work, take care of things at home, or get along with other people?: Somewhat difficult  PHQ9 TOTAL SCORE: 11

## 2024-03-14 ENCOUNTER — VIRTUAL VISIT (OUTPATIENT)
Dept: PEDIATRICS | Facility: CLINIC | Age: 24
End: 2024-03-14
Payer: COMMERCIAL

## 2024-03-14 DIAGNOSIS — F90.2 ATTENTION DEFICIT HYPERACTIVITY DISORDER, COMBINED TYPE: ICD-10-CM

## 2024-03-14 DIAGNOSIS — F41.9 ANXIETY: Primary | ICD-10-CM

## 2024-03-14 PROCEDURE — 99215 OFFICE O/P EST HI 40 MIN: CPT | Mod: 95 | Performed by: PEDIATRICS

## 2024-03-14 NOTE — LETTER
"  3/14/2024      RE: Hawk Wiggins  [address]  [facility] [address]     Dear Colleague,    Thank you for referring your patient, Hawk Wiggins, to the [facility]. Please see a copy of my visit note below.    ASSESSMENT:  ADHD, combined type. Currently off medication.  Generalized anxiety.  Major depressive disorder, single episode, moderate.     PLAN:   Diagnostic: No new diagnostic studies at this time.  Behavior management: Continue to utilize healthy routines.  Sleep: Monitor.  Physical Activity: Going to the gym regularly.  Nutrition: Healthy nourishment.  Relationships: Explore groups, clubs, and activities. Explore the unique kia of solitude.   Creative expression: Creativity, natural world, non-human animal connection, giving to others, and physical activity can be avenues to deeply enjoying episodes of solitude.  Medication: Continue sertraline 100 mg daily. Continue Metadate ER to 20 mg daily for inattention. Consider the possibility that stimulants are potentiating anxiety.  Education: At [employer], pursuing a degree in marketing.  [facility] Collaboration: None.  Therapy: Weekly. Exposure response prevention or CBT may be particularly helpful.  Rehabilitation: None.  Community supports: None.   Follow-up: Monthly.    Hawk Wiggins is a 23year 7month old last seen on February 15.   Estimated body mass index is 21.52 kg/m  as calculated from the following:    Height as of 10/26/23: 5' 10\" (177.8 cm).    Weight as of 2/15/24: 150 lb (68 kg).   No height and weight on file for this encounter.     This was a telemedicine visit with the physician located at home.     The EMR was reviewed on the day of the visit to review my most recent note and the intervening events since the last visit. Pertinent information from that review includes the following: request to increase sertraline to 100 mg    Today, Ghazal Hawk's mother requested a parent-only visit.     \"Hawk knows we're " "talking today.\" \"I don't know if he tells you this but his anxiety is off the charts.\" \"He's going to work with an exposure therapist.\" \"He fell victim to a scam artist.\" \"His friends mostly just want to sit around and smoke pot.\"     Provided substantial maternal guidance about management of parental anxiety and working with her own therapist to become more comfortable and relaxed with this work.    Topics for today: increased sertraline to 100 mg-response  Tasks for today: check med renewal    Monthly through May    45 minutes spent on the date of the encounter doing chart review, history and exam, documentation and further activities per the note      Again, thank you for allowing me to participate in the care of your patient.      Sincerely,    Crystal Chirinos MD  "

## 2024-03-14 NOTE — NURSING NOTE
Is the patient currently in the state of [place]? YES    Visit mode:VIDEO    If the visit is dropped, the patient can be reconnected by: VIDEO VISIT: Text to cell phone:   Telephone Information:   Mobile [phone]       Will anyone else be joining the visit? NO  (If patient encounters technical issues they should call [phone]    How would you like to obtain your AVS? MyChart    Are changes needed to the allergy or medication list? Pt stated no changes to allergies and Pt stated no med changes    Reason for visit: OSORIO WOODSF

## 2024-03-14 NOTE — PROGRESS NOTES
"ASSESSMENT:  ADHD, combined type. Currently off medication.  Generalized anxiety.  Major depressive disorder, single episode, moderate.     PLAN:   Diagnostic: No new diagnostic studies at this time.  Behavior management: Continue to utilize healthy routines.  Sleep: Monitor.  Physical Activity: Going to the gym regularly.  Nutrition: Healthy nourishment.  Relationships: Explore groups, clubs, and activities. Explore the unique kia of solitude.   Creative expression: Creativity, natural world, non-human animal connection, giving to others, and physical activity can be avenues to deeply enjoying episodes of solitude.  Medication: Continue sertraline 100 mg daily. Continue Metadate ER to 20 mg daily for inattention. Consider the possibility that stimulants are potentiating anxiety.  Education: At [employer], pursuing a degree in marketing.  [facility] Collaboration: None.  Therapy: Weekly. Exposure response prevention or CBT may be particularly helpful.  Rehabilitation: None.  Community supports: None.   Follow-up: Monthly.    Hawk Wiggins is a 23year 7month old last seen on February 15.   Estimated body mass index is 21.52 kg/m  as calculated from the following:    Height as of 10/26/23: 5' 10\" (177.8 cm).    Weight as of 2/15/24: 150 lb (68 kg).   No height and weight on file for this encounter.     This was a telemedicine visit with the physician located at home.     The EMR was reviewed on the day of the visit to review my most recent note and the intervening events since the last visit. Pertinent information from that review includes the following: request to increase sertraline to 100 mg    Today, Hawk Harman's mother requested a parent-only visit.     \"Hawk knows we're talking today.\" \"I don't know if he tells you this but his anxiety is off the charts.\" \"He's going to work with an exposure therapist.\" \"He fell victim to a scam artist.\" \"His friends mostly just want to sit around and smoke pot.\"     Provided substantial " maternal guidance about management of parental anxiety and working with her own therapist to become more comfortable and relaxed with this work.    Topics for today: increased sertraline to 100 mg-response  Tasks for today: check med renewal    Monthly through May    45 minutes spent on the date of the encounter doing chart review, history and exam, documentation and further activities per the note

## 2024-03-18 DIAGNOSIS — F32.1 CURRENT MODERATE EPISODE OF MAJOR DEPRESSIVE DISORDER WITHOUT PRIOR EPISODE (H): ICD-10-CM

## 2024-03-18 RX ORDER — SERTRALINE HYDROCHLORIDE 100 MG/1
100 TABLET, FILM COATED ORAL DAILY
Qty: 30 TABLET | Refills: 0 | Status: SHIPPED | OUTPATIENT
Start: 2024-03-18 | End: 2024-03-21

## 2024-03-18 NOTE — TELEPHONE ENCOUNTER
Refill request received from: [facility] pharmacy via fax    Last appointment: 3/14/2024    RTC: monthly    Canceled appointments: 0    No Showed appointments: 0    Follow up scheduled: 3/21/2024    Requested medication(s) (copy and paste last order information):     Disp Refills Start End LAWRENCE    sertraline (ZOLOFT) 100 MG tablet 30 tablet 0 2/19/2024 -- No   Sig - Route: Take 1 tablet (100 mg) by mouth daily - Oral   Sent to pharmacy as: Sertraline HCl 100 MG Oral Tablet (ZOLOFT)   Class: E-Prescribe   Order: [id number]   E-Prescribing Status: Receipt confirmed by pharmacy (2/19/2024  3:40 PM CST)       Date medication last filled per outside med information: 2/19/2024 for 30 d/s    Months of medication approved per refill protocol: 1    Per last visit note:  PLAN:   Diagnostic: No new diagnostic studies at this time.  Behavior management: Continue to utilize healthy routines.  Sleep: Monitor.  Physical Activity: Going to the gym regularly.  Nutrition: Healthy nourishment.  Relationships: Explore groups, clubs, and activities. Explore the unique kia of solitude.   Creative expression: Creativity, natural world, non-human animal connection, giving to others, and physical activity can be avenues to deeply enjoying episodes of solitude.  Medication: Continue sertraline 100 mg daily. Continue Metadate ER to 20 mg daily for inattention. Consider the possibility that stimulants are potentiating anxiety.    Gauri Meza RN

## 2024-03-21 ENCOUNTER — VIRTUAL VISIT (OUTPATIENT)
Dept: PEDIATRICS | Facility: CLINIC | Age: 24
End: 2024-03-21
Payer: COMMERCIAL

## 2024-03-21 DIAGNOSIS — F90.2 ATTENTION DEFICIT HYPERACTIVITY DISORDER, COMBINED TYPE: Primary | ICD-10-CM

## 2024-03-21 DIAGNOSIS — F41.9 ANXIETY: ICD-10-CM

## 2024-03-21 DIAGNOSIS — F32.1 CURRENT MODERATE EPISODE OF MAJOR DEPRESSIVE DISORDER WITHOUT PRIOR EPISODE (H): ICD-10-CM

## 2024-03-21 PROCEDURE — 99214 OFFICE O/P EST MOD 30 MIN: CPT | Mod: 95 | Performed by: PEDIATRICS

## 2024-03-21 RX ORDER — SERTRALINE HYDROCHLORIDE 100 MG/1
100 TABLET, FILM COATED ORAL DAILY
Qty: 30 TABLET | Refills: 3 | Status: SHIPPED | OUTPATIENT
Start: 2024-03-21 | End: 2024-05-16

## 2024-03-21 NOTE — PROGRESS NOTES
"     ASSESSMENT:  ADHD, combined type. Currently off medication.  Generalized anxiety.  Major depressive disorder, single episode, moderate.     PLAN:   Diagnostic: No new diagnostic studies at this time.  Behavior management: Continue to utilize healthy routines. Agree with meditation practice.  Sleep: Monitor.  Physical Activity: Going to the gym regularly.  Nutrition: Healthy nourishment.  Relationships: Explore groups, clubs, and activities. Explore the unique kia of solitude.   Creative expression: Creativity, natural world, non-human animal connection, giving to others, and physical activity can be avenues to deeply enjoying episodes of solitude.  Medication: Continue sertraline 100 mg daily. Continue Metadate ER to 20 mg daily for inattention. Consider the possibility that stimulants are potentiating anxiety.  Education: At [employer], pursuing a degree in marketing.  [facility] Collaboration: None.  Therapy: Weekly. Exposure response prevention or CBT may be particularly helpful.  Rehabilitation: None.  Community supports: None.   Follow-up: Monthly.    Hawk Wiggins is a 23year 7month old last seen on February 15; maternal visit on March 14.   Estimated body mass index is 21.52 kg/m  as calculated from the following:    Height as of 10/26/23: 5' 10\" (177.8 cm).    Weight as of 2/15/24: 150 lb (68 kg).   No height and weight on file for this encounter.     This was a telemedicine visit with the physician located at home.     The EMR was reviewed on the day of the visit to review my most recent note and the intervening events since the last visit. Pertinent information from that review includes the following: No additional visits since last seen.    Today, I visited with Hawk on his own.     \"I started the month off with some heavy anxiety that was causing me a lot of stress and tension and made a lot of things difficulty.\"     \"I've adopted the use of meditation.\" He is using meditation coaching videos called Bishop " "Himself. The focus is on \"letting go.\"     \"Now that I'm doing meditation, it's easier for me to come down from anxiety.\"     I've \"also been working on self-confidence.\"     Struggling a little with low motivation tasks. Discussed some behavioral approaches to generating motivation.    Topics for today: maternal visit, anxiety, therapy, MPH at 20, stimulants and anxiety  Tasks for today: check med renewal    Next visit is 4/18, 5/16     39 minutes spent on the date of the encounter doing chart review, history and exam, documentation and further activities per the note    "

## 2024-03-21 NOTE — LETTER
"  3/21/2024      RE: Hawk Wiggins  [address]  [facility] [address]     Dear Colleague,    Thank you for referring your patient, Hawk Wiggins, to the [facility]. Please see a copy of my visit note below.         ASSESSMENT:  ADHD, combined type. Currently off medication.  Generalized anxiety.  Major depressive disorder, single episode, moderate.     PLAN:   Diagnostic: No new diagnostic studies at this time.  Behavior management: Continue to utilize healthy routines. Agree with meditation practice.  Sleep: Monitor.  Physical Activity: Going to the gym regularly.  Nutrition: Healthy nourishment.  Relationships: Explore groups, clubs, and activities. Explore the unique kia of solitude.   Creative expression: Creativity, natural world, non-human animal connection, giving to others, and physical activity can be avenues to deeply enjoying episodes of solitude.  Medication: Continue sertraline 100 mg daily. Continue Metadate ER to 20 mg daily for inattention. Consider the possibility that stimulants are potentiating anxiety.  Education: At [employer], pursuing a degree in marketing.  [facility] Collaboration: None.  Therapy: Weekly. Exposure response prevention or CBT may be particularly helpful.  Rehabilitation: None.  Community supports: None.   Follow-up: Monthly.    Hawk Wiggins is a 23year 7month old last seen on February 15; maternal visit on March 14.   Estimated body mass index is 21.52 kg/m  as calculated from the following:    Height as of 10/26/23: 5' 10\" (177.8 cm).    Weight as of 2/15/24: 150 lb (68 kg).   No height and weight on file for this encounter.     This was a telemedicine visit with the physician located at home.     The EMR was reviewed on the day of the visit to review my most recent note and the intervening events since the last visit. Pertinent information from that review includes the following: No additional visits since last seen.    Today, I visited with " "Hawk on his own.     \"I started the month off with some heavy anxiety that was causing me a lot of stress and tension and made a lot of things difficulty.\"     \"I've adopted the use of meditation.\" He is using meditation coaching videos called Bishop Himself. The focus is on \"letting go.\"     \"Now that I'm doing meditation, it's easier for me to come down from anxiety.\"     I've \"also been working on self-confidence.\"     Struggling a little with low motivation tasks. Discussed some behavioral approaches to generating motivation.    Topics for today: maternal visit, anxiety, therapy, MPH at 20, stimulants and anxiety  Tasks for today: check med renewal    Next visit is 4/18, 5/16     39 minutes spent on the date of the encounter doing chart review, history and exam, documentation and further activities per the note      Again, thank you for allowing me to participate in the care of your patient.      Sincerely,    Crystal Chirinos MD  "

## 2024-03-21 NOTE — NURSING NOTE
Is the patient currently in the state of [place]? YES    Visit mode:VIDEO    If the visit is dropped, the patient can be reconnected by: VIDEO VISIT: Text to cell phone:   Telephone Information:   Mobile [phone]       Will anyone else be joining the visit? NO  (If patient encounters technical issues they should call [phone]    How would you like to obtain your AVS? MyChart    Are changes needed to the allergy or medication list? Pt stated no changes to allergies and Pt stated no med changes    Quick check in-pt called clinic at 3:43pm - unable to complete rooming/qnrs    Reason for visit: RECHECK    Christopher WOODSF

## 2024-04-18 ENCOUNTER — VIRTUAL VISIT (OUTPATIENT)
Dept: PEDIATRICS | Facility: CLINIC | Age: 24
End: 2024-04-18
Payer: COMMERCIAL

## 2024-04-18 DIAGNOSIS — F32.1 CURRENT MODERATE EPISODE OF MAJOR DEPRESSIVE DISORDER WITHOUT PRIOR EPISODE (H): ICD-10-CM

## 2024-04-18 DIAGNOSIS — F41.9 ANXIETY: ICD-10-CM

## 2024-04-18 DIAGNOSIS — F90.2 ATTENTION DEFICIT HYPERACTIVITY DISORDER, COMBINED TYPE: Primary | ICD-10-CM

## 2024-04-18 PROCEDURE — 99214 OFFICE O/P EST MOD 30 MIN: CPT | Mod: 95 | Performed by: PEDIATRICS

## 2024-04-18 RX ORDER — METHYLPHENIDATE HYDROCHLORIDE 20 MG/1
20 CAPSULE, EXTENDED RELEASE ORAL DAILY
Qty: 30 CAPSULE | Refills: 0 | Status: SHIPPED | OUTPATIENT
Start: 2024-04-18 | End: 2024-05-18

## 2024-04-18 RX ORDER — METHYLPHENIDATE HYDROCHLORIDE 20 MG/1
20 CAPSULE, EXTENDED RELEASE ORAL DAILY
Qty: 30 CAPSULE | Refills: 0 | Status: SHIPPED | OUTPATIENT
Start: 2024-06-19 | End: 2024-07-11

## 2024-04-18 RX ORDER — METHYLPHENIDATE HYDROCHLORIDE 20 MG/1
20 CAPSULE, EXTENDED RELEASE ORAL DAILY
Qty: 30 CAPSULE | Refills: 0 | Status: SHIPPED | OUTPATIENT
Start: 2024-05-19 | End: 2024-06-18

## 2024-04-18 NOTE — PROGRESS NOTES
" ASSESSMENT:  ADHD, combined type.   Generalized anxiety.  Major depressive disorder, single episode, moderate.     PLAN:   Diagnostic: No new diagnostic studies at this time.  Behavior management: Continue to utilize healthy routines. Continue meditation and mindfulness practice.  Sleep: Monitor.  Physical Activity: Going to the gym regularly.  Nutrition: Healthy nourishment.  Relationships: Explore groups, clubs, and activities.   Creative expression: Creativity, natural world, non-human animal connection, giving to others, and physical activity can be avenues to deeply enjoying episodes of solitude.  Medication: Continue sertraline 100 mg daily. Continue Metadate ER to 20 mg daily for inattention. Consider the possibility that stimulants are potentiating anxiety.  Education: At [employer], pursuing a degree in marketing.  [facility] Collaboration: None.  Therapy: Weekly. Exposure response prevention or CBT may be particularly helpful.  Rehabilitation: None.  Community supports: None.   Follow-up: Monthly.    Hawk Wiggins is a 23year 8month old last seen on March 21.     Estimated body mass index is 21.52 kg/m  as calculated from the following:    Height as of 10/26/23: 5' 10\" (177.8 cm).    Weight as of 2/15/24: 150 lb (68 kg).   No height and weight on file for this encounter.     This was a telemedicine visit with the physician located at home.     The EMR was reviewed on the day of the visit to review my most recent note and the intervening events since the last visit. Pertinent information from that review includes the following: No additional visits since last seen.    \"Just trying to stay productive and busy. I'm noticing I'm really good with structure. I want to get my life back on track. This would be a structured, scheduled, on time, and not procrastinating. I'm trying my best to not put things off. I time block and know I have to do this thing by this time. I'm trying to do things in a timely order so I have more " "time to do the things I want to do. Doing the things I want to do means I have to do things efficiently. I'm just so lazy. I'm doing meditation which I'm really happy about. I'm discovering that I'm trying to get better at listening and trying to be more present.\"    Topics for today: anxiety, self care, mood, stimulants  Tasks for today: check med renewal, Eliza visit    Next visit is 5/16     35 minutes spent on the date of the encounter doing chart review, history and exam, documentation and further activities per the note    "

## 2024-04-18 NOTE — NURSING NOTE
Is the patient currently in the state of [place]? YES    Visit mode:VIDEO    If the visit is dropped, the patient can be reconnected by: VIDEO VISIT: Text to cell phone:   Telephone Information:   Mobile [phone]       Will anyone else be joining the visit? NO  (If patient encounters technical issues they should call [phone]    How would you like to obtain your AVS? MyChart    Are changes needed to the allergy or medication list? Pt stated no changes to allergies and Pt stated no med changes    Are refills needed on medications prescribed by this physician? NO    Reason for visit: OSORIO WOODSF

## 2024-04-18 NOTE — LETTER
"  4/18/2024      RE: Hawk Wiggins  [address]  [facility] [address]     Dear Colleague,    Thank you for referring your patient, Hawk Wiggins, to the [facility]. Please see a copy of my visit note below.     ASSESSMENT:  ADHD, combined type.   Generalized anxiety.  Major depressive disorder, single episode, moderate.     PLAN:   Diagnostic: No new diagnostic studies at this time.  Behavior management: Continue to utilize healthy routines. Continue meditation and mindfulness practice.  Sleep: Monitor.  Physical Activity: Going to the gym regularly.  Nutrition: Healthy nourishment.  Relationships: Explore groups, clubs, and activities.   Creative expression: Creativity, natural world, non-human animal connection, giving to others, and physical activity can be avenues to deeply enjoying episodes of solitude.  Medication: Continue sertraline 100 mg daily. Continue Metadate ER to 20 mg daily for inattention. Consider the possibility that stimulants are potentiating anxiety.  Education: At [employer], pursuing a degree in marketing.  [facility] Collaboration: None.  Therapy: Weekly. Exposure response prevention or CBT may be particularly helpful.  Rehabilitation: None.  Community supports: None.   Follow-up: Monthly.    Hawk Wiggins is a 23year 8month old last seen on March 21.     Estimated body mass index is 21.52 kg/m  as calculated from the following:    Height as of 10/26/23: 5' 10\" (177.8 cm).    Weight as of 2/15/24: 150 lb (68 kg).   No height and weight on file for this encounter.     This was a telemedicine visit with the physician located at home.     The EMR was reviewed on the day of the visit to review my most recent note and the intervening events since the last visit. Pertinent information from that review includes the following: No additional visits since last seen.    \"Just trying to stay productive and busy. I'm noticing I'm really good with structure. I want to get my " "life back on track. This would be a structured, scheduled, on time, and not procrastinating. I'm trying my best to not put things off. I time block and know I have to do this thing by this time. I'm trying to do things in a timely order so I have more time to do the things I want to do. Doing the things I want to do means I have to do things efficiently. I'm just so lazy. I'm doing meditation which I'm really happy about. I'm discovering that I'm trying to get better at listening and trying to be more present.\"    Topics for today: anxiety, self care, mood, stimulants  Tasks for today: check med renewal, Eliza visit    Next visit is 5/16     35 minutes spent on the date of the encounter doing chart review, history and exam, documentation and further activities per the note      Again, thank you for allowing me to participate in the care of your patient.      Sincerely,    Crystal Chirinos MD  "

## 2024-05-16 ENCOUNTER — VIRTUAL VISIT (OUTPATIENT)
Dept: PEDIATRICS | Facility: CLINIC | Age: 24
End: 2024-05-16
Payer: COMMERCIAL

## 2024-05-16 VITALS — HEIGHT: 71 IN | BODY MASS INDEX: 21.7 KG/M2 | WEIGHT: 155 LBS

## 2024-05-16 DIAGNOSIS — F90.2 ATTENTION DEFICIT HYPERACTIVITY DISORDER, COMBINED TYPE: ICD-10-CM

## 2024-05-16 DIAGNOSIS — F32.1 CURRENT MODERATE EPISODE OF MAJOR DEPRESSIVE DISORDER WITHOUT PRIOR EPISODE (H): ICD-10-CM

## 2024-05-16 DIAGNOSIS — F41.9 ANXIETY: Primary | ICD-10-CM

## 2024-05-16 PROCEDURE — 99213 OFFICE O/P EST LOW 20 MIN: CPT | Mod: 95 | Performed by: PEDIATRICS

## 2024-05-16 RX ORDER — SERTRALINE HYDROCHLORIDE 100 MG/1
100 TABLET, FILM COATED ORAL DAILY
Qty: 30 TABLET | Refills: 3 | Status: SHIPPED | OUTPATIENT
Start: 2024-05-16 | End: 2024-07-11

## 2024-05-16 ASSESSMENT — PATIENT HEALTH QUESTIONNAIRE - PHQ9
SUM OF ALL RESPONSES TO PHQ QUESTIONS 1-9: 10
SUM OF ALL RESPONSES TO PHQ QUESTIONS 1-9: 10
10. IF YOU CHECKED OFF ANY PROBLEMS, HOW DIFFICULT HAVE THESE PROBLEMS MADE IT FOR YOU TO DO YOUR WORK, TAKE CARE OF THINGS AT HOME, OR GET ALONG WITH OTHER PEOPLE: SOMEWHAT DIFFICULT

## 2024-05-16 ASSESSMENT — PAIN SCALES - GENERAL: PAINLEVEL: NO PAIN (0)

## 2024-05-16 NOTE — NURSING NOTE
Is the patient currently in the state of [place]? YES    Visit mode:VIDEO    If the visit is dropped, the patient can be reconnected by: VIDEO VISIT: Text to cell phone:   Telephone Information:   Mobile [phone]       Will anyone else be joining the visit? NO  (If patient encounters technical issues they should call [phone]    How would you like to obtain your AVS? MyChart    Are changes needed to the allergy or medication list? No    Are refills needed on medications prescribed by this physician? YES    Reason for visit: RECHECK    Ashley GALVAN

## 2024-05-16 NOTE — PATIENT INSTRUCTIONS
**For crisis resources, please see the information at the end of this document**   Patient Education    Thank you for coming to the [facility].     Lab Testing:  If you had lab testing today and your results are reassuring or normal they will be mailed to you or sent through [employer] within 7 days. If the lab tests need quick action we will call you with the results. The phone number we will call with results is # [phone]. If this is not the best number please call our clinic and change the number.     Medication Refills:  If you need any refills please call your pharmacy and they will contact us. Our fax number for refills is [phone].   Three business days of notice are needed for general medication refill requests.   Five business days of notice are needed for controlled substance refill requests.   If you need to change to a different pharmacy, please contact the new pharmacy directly. The new pharmacy will help you get your medications transferred.     Contact Us:  Please call [phone] during business hours (8-5:00 M-F).   If you have medication related questions after clinic hours, or on the weekend, please call [phone].     Financial Assistance [phone]   Medical Records [phone]       MENTAL HEALTH CRISIS RESOURCES:  For a emergency help, please call 911 or go to the nearest Emergency Department.     Emergency Walk-In Options:   EmPATH Unit @ [facility] ([place]): [phone] - Specialized mental health emergency area designed to be calming  [facility] ([place]): [phone]  [facility] ([place]): [phone]  [facility]): [phone]    [facility] Crisis Information:   [place]: [phone]  [place]: [phone]  [place] ([place]) - Adult: [phone]     Child: [phone]  [place] - Adult: [phone]     Child: [phone]  [place]: [phone]  List of all [place]  [facility] resources:   https://[place].gov/dhs/people-we-serve/adults/health-care/mental-health/resources/crisis-contacts.jsp    National Crisis Information:   Crisis Text Line: Text  [place]  to 407897  Suicide & Crisis Lifeline: 988  National Suicide Prevention Lifeline: 0-374-845-TALK (0-552-754-7994)       For online chat options, visit https://suicidepreventionlifeline.org/chat/  Poison Control Center: 1-219-854-4351  Trans Lifeline: 6-465-391-1166 - Hotline for transgender people of all ages  [employer]: 6-244-224-5517 - Hotline for LGBT youth     For Non-Emergency Support:   Fast Tracker: Mental Health & Substance Use Disorder Resources -   https://www.Ku[employer]n.org/

## 2024-05-16 NOTE — LETTER
"  5/16/2024      RE: Hawk Wiggins  [address]  [facility] [address]     Dear Colleague,    Thank you for referring your patient, Hawk Wiggins, to the [facility]. Please see a copy of my visit note below.    ASSESSMENT:  ADHD, combined type.   Generalized anxiety.  Major depressive disorder, single episode, moderate.     PLAN:   Diagnostic: No new diagnostic studies at this time.  Behavior management: Learning how to pace progress and take the win. Continue to use mindfulness to become aware of and appreciate the present moment.   Sleep: Monitor.  Physical Activity: Going to the gym regularly.  Nutrition: Healthy nourishment.  Relationships: Explore groups, clubs, and activities.   Creative expression: Creativity, natural world, non-human animal connection, giving to others, and physical activity can be avenues to deeply enjoying episodes of solitude.  Medication: Continue sertraline 100 mg daily. Continue Metadate ER to 20 mg daily for inattention. Consider the possibility that stimulants are potentiating anxiety.  Education: At [employer], pursuing a degree in marketing.  [facility] Collaboration: None.  Therapy: Weekly. Exposure response prevention or CBT may be particularly helpful.  Rehabilitation: None.  Community supports: None.   Follow-up: Monthly.    Hawk Wiggins is a 23year 9month old last seen on April 18.   Estimated body mass index is 21.52 kg/m  as calculated from the following:    Height as of 10/26/23: 5' 10\" (177.8 cm).    Weight as of 2/15/24: 150 lb (68 kg).   No height and weight on file for this encounter.     This was a telemedicine visit with the physician located at home.     The EMR was reviewed on the day of the visit to review my most recent note and the intervening events since the last visit. Pertinent information from that review includes the following: No additional visits since last seen.    \"Just trying to keep going. Having structure helps me not sit " around and ruminate. My anxiety tries to pick at everything and snowball it. One example is my health. I started trying to take my blood pressure all the time. My mom noticed and told me I didn't have a blood pressure problem. Then I just stopped doing the behavior and I started to relax. A lot of it came from seeing my mom get in her head about her heart.     He's putting himself into social situations and seeking to get a job to get some independence.     Topics for today: mood, anxiety, stimulants and anxiety  Tasks for today: check med renewal, August visit    Next visit is 6/20, 7/11     25 minutes spent on the date of the encounter doing chart review, history and exam, documentation and further activities per the note    Answers submitted by the patient for this visit:  Patient Health Questionnaire (Submitted on 5/16/2024)  If you checked off any problems, how difficult have these problems made it for you to do your work, take care of things at home, or get along with other people?: Somewhat difficult  PHQ9 TOTAL SCORE: 10      Again, thank you for allowing me to participate in the care of your patient.      Sincerely,    Crystal Chirinos MD

## 2024-05-16 NOTE — PROGRESS NOTES
"ASSESSMENT:  ADHD, combined type.   Generalized anxiety.  Major depressive disorder, single episode, moderate.     PLAN:   Diagnostic: No new diagnostic studies at this time.  Behavior management: Learning how to pace progress and take the win. Continue to use mindfulness to become aware of and appreciate the present moment.   Sleep: Monitor.  Physical Activity: Going to the gym regularly.  Nutrition: Healthy nourishment.  Relationships: Explore groups, clubs, and activities.   Creative expression: Creativity, natural world, non-human animal connection, giving to others, and physical activity can be avenues to deeply enjoying episodes of solitude.  Medication: Continue sertraline 100 mg daily. Continue Metadate ER to 20 mg daily for inattention. Consider the possibility that stimulants are potentiating anxiety.  Education: At [employer], pursuing a degree in marketing.  [facility] Collaboration: None.  Therapy: Weekly. Exposure response prevention or CBT may be particularly helpful.  Rehabilitation: None.  Community supports: None.   Follow-up: Monthly.    Hawk Wiggins is a 23year 9month old last seen on April 18.   Estimated body mass index is 21.52 kg/m  as calculated from the following:    Height as of 10/26/23: 5' 10\" (177.8 cm).    Weight as of 2/15/24: 150 lb (68 kg).   No height and weight on file for this encounter.     This was a telemedicine visit with the physician located at home.     The EMR was reviewed on the day of the visit to review my most recent note and the intervening events since the last visit. Pertinent information from that review includes the following: No additional visits since last seen.    \"Just trying to keep going. Having structure helps me not sit around and ruminate. My anxiety tries to pick at everything and snowball it. One example is my health. I started trying to take my blood pressure all the time. My mom noticed and told me I didn't have a blood pressure problem. Then I just stopped doing " the behavior and I started to relax. A lot of it came from seeing my mom get in her head about her heart.     He's putting himself into social situations and seeking to get a job to get some independence.     Topics for today: mood, anxiety, stimulants and anxiety  Tasks for today: check med renewal, August visit    Next visit is 6/20, 7/11     25 minutes spent on the date of the encounter doing chart review, history and exam, documentation and further activities per the note    Answers submitted by the patient for this visit:  Patient Health Questionnaire (Submitted on 5/16/2024)  If you checked off any problems, how difficult have these problems made it for you to do your work, take care of things at home, or get along with other people?: Somewhat difficult  PHQ9 TOTAL SCORE: 10

## 2024-07-11 ENCOUNTER — VIRTUAL VISIT (OUTPATIENT)
Dept: PEDIATRICS | Facility: CLINIC | Age: 24
End: 2024-07-11
Payer: COMMERCIAL

## 2024-07-11 DIAGNOSIS — F41.9 ANXIETY: ICD-10-CM

## 2024-07-11 DIAGNOSIS — F90.2 ATTENTION DEFICIT HYPERACTIVITY DISORDER, COMBINED TYPE: Primary | ICD-10-CM

## 2024-07-11 DIAGNOSIS — F32.1 CURRENT MODERATE EPISODE OF MAJOR DEPRESSIVE DISORDER WITHOUT PRIOR EPISODE (H): ICD-10-CM

## 2024-07-11 PROCEDURE — G2211 COMPLEX E/M VISIT ADD ON: HCPCS | Mod: 95 | Performed by: PEDIATRICS

## 2024-07-11 PROCEDURE — 99215 OFFICE O/P EST HI 40 MIN: CPT | Mod: 95 | Performed by: PEDIATRICS

## 2024-07-11 RX ORDER — METHYLPHENIDATE HYDROCHLORIDE 20 MG/1
20 CAPSULE, EXTENDED RELEASE ORAL DAILY
Qty: 30 CAPSULE | Refills: 0 | Status: SHIPPED | OUTPATIENT
Start: 2024-07-11 | End: 2024-08-15

## 2024-07-11 RX ORDER — SERTRALINE HYDROCHLORIDE 100 MG/1
100 TABLET, FILM COATED ORAL DAILY
Qty: 30 TABLET | Refills: 3 | Status: SHIPPED | OUTPATIENT
Start: 2024-07-11 | End: 2024-09-19

## 2024-07-11 ASSESSMENT — PATIENT HEALTH QUESTIONNAIRE - PHQ9
SUM OF ALL RESPONSES TO PHQ QUESTIONS 1-9: 5
10. IF YOU CHECKED OFF ANY PROBLEMS, HOW DIFFICULT HAVE THESE PROBLEMS MADE IT FOR YOU TO DO YOUR WORK, TAKE CARE OF THINGS AT HOME, OR GET ALONG WITH OTHER PEOPLE: SOMEWHAT DIFFICULT
SUM OF ALL RESPONSES TO PHQ QUESTIONS 1-9: 5

## 2024-07-11 NOTE — PROGRESS NOTES
"  ASSESSMENT:  ADHD, combined type.   Generalized anxiety.  Major depressive disorder, single episode, moderate.     PLAN:   Diagnostic: No new diagnostic studies at this time.  Behavior management: Undoing thought patterns that reinforce anxiety and depression.  Sleep: Monitor.  Physical Activity: Going to the gym regularly.  Nutrition: Healthy nourishment.  Relationships: Explore groups, clubs, and activities.   Creative expression: Creativity, natural world, non-human animal connection, giving to others, and physical activity can be avenues to deeply enjoying episodes of solitude.  Medication: Continue sertraline 100 mg daily. Continue Metadate ER to 20 mg daily for inattention.   Education: At [employer], pursuing a degree in marketing.  [facility] Collaboration: None.  Therapy: Weekly. Exposure response prevention or CBT may be particularly helpful.  Rehabilitation: None.  Community supports: None.   Follow-up: Monthly.    Hawk Wiggins is a 23year 11month old last seen on May 16.   Estimated body mass index is 21.93 kg/m  as calculated from the following:    Height as of 5/16/24: 5' 10.5\" (179.1 cm).    Weight as of 5/16/24: 155 lb (70.3 kg).   No height and weight on file for this encounter.     This was a telemedicine visit with the physician located at home.     The EMR was reviewed on the day of the visit to review my most recent note and the intervening events since the last visit. Pertinent information from that review includes the following: No additional visits since last seen.    Answers submitted by the patient for this visit:  Patient Health Questionnaire (Submitted on 7/11/2024)  If you checked off any problems, how difficult have these problems made it for you to do your work, take care of things at home, or get along with other people?: Somewhat difficult  PHQ9 TOTAL SCORE: 5    \"Good days and bad days.\" \"Lately what I've discovered is that my outlook on life is greatly affecting my life. I'm caught in this " "giant web of homing in on the negatives and the things I haven't accomplished. I should be doing everything I can to view the world from a more positive outlook.\" This is mostly characterized by persistent worry about how he is viewed by others.    Topics for today: mood, anxiety, medications  Tasks for today: check med renewal    Next visit is 8/15, 9/19, 10/17     44 minutes spent on the date of the encounter doing chart review, history and exam, documentation and further activities per the note. The longitudinal plan of care for the diagnosis(es)/condition(s) as documented were addressed during this visit. Due to the added complexity in care, I will continue to support Hawk in the subsequent management and with ongoing continuity of care.      "

## 2024-07-11 NOTE — LETTER
"  7/11/2024      RE: Hawk Wiggins  [address] [facility] [address]     Dear Colleague,    Thank you for referring your patient, Hawk Wiggins, to the [facility]. Please see a copy of my visit note below.      ASSESSMENT:  ADHD, combined type.   Generalized anxiety.  Major depressive disorder, single episode, moderate.     PLAN:   Diagnostic: No new diagnostic studies at this time.  Behavior management: Undoing thought patterns that reinforce anxiety and depression.  Sleep: Monitor.  Physical Activity: Going to the gym regularly.  Nutrition: Healthy nourishment.  Relationships: Explore groups, clubs, and activities.   Creative expression: Creativity, natural world, non-human animal connection, giving to others, and physical activity can be avenues to deeply enjoying episodes of solitude.  Medication: Continue sertraline 100 mg daily. Continue Metadate ER to 20 mg daily for inattention.   Education: At [employer], pursuing a degree in marketing.  [facility] Collaboration: None.  Therapy: Weekly. Exposure response prevention or CBT may be particularly helpful.  Rehabilitation: None.  Community supports: None.   Follow-up: Monthly.    Hawk Wiggins is a 23year 11month old last seen on May 16.   Estimated body mass index is 21.93 kg/m  as calculated from the following:    Height as of 5/16/24: 5' 10.5\" (179.1 cm).    Weight as of 5/16/24: 155 lb (70.3 kg).   No height and weight on file for this encounter.     This was a telemedicine visit with the physician located at home.     The EMR was reviewed on the day of the visit to review my most recent note and the intervening events since the last visit. Pertinent information from that review includes the following: No additional visits since last seen.    Answers submitted by the patient for this visit:  Patient Health Questionnaire (Submitted on 7/11/2024)  If you checked off any problems, how difficult have these problems made it for you to " "do your work, take care of things at home, or get along with other people?: Somewhat difficult  PHQ9 TOTAL SCORE: 5    \"Good days and bad days.\" \"Lately what I've discovered is that my outlook on life is greatly affecting my life. I'm caught in this giant web of homing in on the negatives and the things I haven't accomplished. I should be doing everything I can to view the world from a more positive outlook.\" This is mostly characterized by persistent worry about how he is viewed by others.    Topics for today: mood, anxiety, medications  Tasks for today: check med renewal    Next visit is 8/15, 9/19, 10/17     44 minutes spent on the date of the encounter doing chart review, history and exam, documentation and further activities per the note. The longitudinal plan of care for the diagnosis(es)/condition(s) as documented were addressed during this visit. Due to the added complexity in care, I will continue to support Hawk in the subsequent management and with ongoing continuity of care.        Again, thank you for allowing me to participate in the care of your patient.      Sincerely,    Crystal Chirinos MD  "

## 2024-07-11 NOTE — NURSING NOTE
Current patient location: [address] [place] [id number]    Is the patient currently in the state of [place]? YES    Visit mode:VIDEO    If the visit is dropped, the patient can be reconnected by: VIDEO VISIT: Text to cell phone:   Telephone Information:   Mobile [phone]       Will anyone else be joining the visit? NO  (If patient encounters technical issues they should call [phone])    How would you like to obtain your AVS? MyChart    Are changes needed to the allergy or medication list? No    Are refills needed on medications prescribed by this physician? NO    Reason for visit: No chief complaint on file.    Clara WOODSF

## 2024-07-26 ENCOUNTER — TELEPHONE (OUTPATIENT)
Dept: PEDIATRICS | Facility: CLINIC | Age: 24
End: 2024-07-26
Payer: COMMERCIAL

## 2024-07-26 NOTE — TELEPHONE ENCOUNTER
Patient and mom called to inquire about having a referral letter sent for the [facility] program.    Thank you!

## 2024-07-27 NOTE — TELEPHONE ENCOUNTER
Hello there,   I'm not quite sure what that is. If they have instructions and could send them in a pdf through [employer], that wouldl be great.   Best,    Kaila

## 2024-08-08 NOTE — TELEPHONE ENCOUNTER
A return phone call was placed to the mother of Hawk Wiggins, Ghazal, today (08/08/24) at 10:15 AM and a detailed message left on voicemail (Consent to Communicate verified) letting Ghazal know that a formal referral is not generally needed for the [facility] program, however we would be happy to assist her if she would like to request copies of visit documentation from her care with Dr Chirinos.    [facility] phone number provided.  Additionally Ghazal was guided to review available documents in [employer] or to call [facility] for additional assistance obtaining existing documentation.    Brigid Goodson RN

## 2024-08-13 NOTE — PROGRESS NOTES
"Virtual Visit Details    Type of service:  Video Visit     Originating Location (pt. Location): {video visit patient location:[address]::\"Home\"}  {PROVIDER LOCATION On-site should be selected for visits conducted from your clinic location or adjoining [facility] hospital, academic office, or other nearby [facility] building. Off-site should be selected for all other provider locations, including home:366947}  Distant Location (provider location):  {virtual location provider:469102}  Platform used for Video Visit: {Virtual Visit Platforms:999991::\"[employer]\"}    " I received a letter from [facility] dated 8-6-2024. I will scan the letter into Epic under the media tab.     APPROVED  Reference Number [id number]  Date Range 9-1-2024 to 11-  CPT Code 07066    I spoke with the patient at the home number listed. The patient is now scheduled for surgery with  on 11-7-2024.     The patient is aware of H&P.    The patient is scheduled for her post op appointment 11-.    I will submit the case request to [facility] today.     I will email the surgery information and instructions to the patient today at [email or web address].    I will close this phone note.

## 2024-08-15 ENCOUNTER — VIRTUAL VISIT (OUTPATIENT)
Dept: PEDIATRICS | Facility: CLINIC | Age: 24
End: 2024-08-15
Payer: COMMERCIAL

## 2024-08-15 DIAGNOSIS — F90.2 ATTENTION DEFICIT HYPERACTIVITY DISORDER, COMBINED TYPE: ICD-10-CM

## 2024-08-15 PROCEDURE — G2211 COMPLEX E/M VISIT ADD ON: HCPCS | Mod: 95 | Performed by: PEDIATRICS

## 2024-08-15 PROCEDURE — 99214 OFFICE O/P EST MOD 30 MIN: CPT | Mod: 95 | Performed by: PEDIATRICS

## 2024-08-15 RX ORDER — METHYLPHENIDATE HYDROCHLORIDE 20 MG/1
20 CAPSULE, EXTENDED RELEASE ORAL DAILY
Qty: 30 CAPSULE | Refills: 0 | Status: SHIPPED | OUTPATIENT
Start: 2024-08-15 | End: 2024-09-19

## 2024-08-15 ASSESSMENT — PATIENT HEALTH QUESTIONNAIRE - PHQ9
SUM OF ALL RESPONSES TO PHQ QUESTIONS 1-9: 2
10. IF YOU CHECKED OFF ANY PROBLEMS, HOW DIFFICULT HAVE THESE PROBLEMS MADE IT FOR YOU TO DO YOUR WORK, TAKE CARE OF THINGS AT HOME, OR GET ALONG WITH OTHER PEOPLE: SOMEWHAT DIFFICULT
SUM OF ALL RESPONSES TO PHQ QUESTIONS 1-9: 2

## 2024-08-15 NOTE — LETTER
"  8/15/2024      RE: Hawk Wiggins  [address]  [facility] [address]     Dear Colleague,    Thank you for referring your patient, Hawk Wiggins, to the [facility]. Please see a copy of my visit note below.    ASSESSMENT:  ADHD, combined type.   Generalized anxiety.  Major depressive disorder, single episode, moderate.     PLAN:   Diagnostic: No new diagnostic studies at this time.  Behavior management: Undoing thought patterns that reinforce anxiety and depression. Continue to develop healthy adult patterns toward connection balanced with independence.  Sleep: Monitor.  Physical Activity: Going to the gym regularly.  Nutrition: Healthy nourishment.  Relationships: Explore groups, clubs, and activities.   Creative expression: Creativity, natural world, non-human animal connection, giving to others, and physical activity can be avenues to deeply enjoying episodes of solitude.  Medication: Continue sertraline 100 mg daily. Continue Metadate ER to 20 mg daily for inattention.   Education: At [employer], pursuing a degree in marketing.  [facility] Collaboration: None.  Therapy: Weekly. Exposure response prevention or CBT may be particularly helpful.  Rehabilitation: None.  Community supports: None.   Follow-up: Monthly.    Hawk is a 24year 0month old last seen on July 11.   Estimated body mass index is 21.93 kg/m  as calculated from the following:    Height as of 5/16/24: 5' 10.5\" (179.1 cm).    Weight as of 5/16/24: 155 lb (70.3 kg).   No height and weight on file for this encounter.     This was a telemedicine visit with the physician located at home.     The EMR was reviewed on the day of the visit to review my most recent note and the intervening events since the last visit. Pertinent information from that review includes the following: No additional visits since last seen.    At today's visit, Hawk, is \"doing good just busy with moving in on campus. It's going to be a good change. " "I'm inherently a social being. Pushing myself to live on campus. I just got really sick of my current circumstances. I'm figuring out I just have to make these changes. I don't know why I didn't make this change at 20.     \"I'll give a personal example. I went out on a date with a chick I met at a mall. I had told myself I would never have a hook-up. But we were really vibing. We hooked up. And then I felt really bad afterwards. And I spent the next two days feeling bad.     \"Achieving financial independence is still really important to me. My mary and I are going to get into social media.     Topics for today: thought patterns, mood, school  Tasks for today: check med renewal    Next visit is 9/19, 10/17     30 minutes spent on the date of the encounter doing chart review, history and exam, documentation and further activities per the note. The longitudinal plan of care for the diagnosis(es)/condition(s) as documented were addressed during this visit. Due to the added complexity in care, I will continue to support Hawk in the subsequent management and with ongoing continuity of care.     Answers submitted by the patient for this visit:  Patient Health Questionnaire (Submitted on 8/15/2024)  If you checked off any problems, how difficult have these problems made it for you to do your work, take care of things at home, or get along with other people?: Somewhat difficult  PHQ9 TOTAL SCORE: 2      Again, thank you for allowing me to participate in the care of your patient.      Sincerely,    Crystal Chirinos MD  "

## 2024-08-15 NOTE — PROGRESS NOTES
"Virtual Visit Details    Type of service:  Video Visit     Originating Location (pt. Location): {video visit patient location:044796::\"Home\"}  {PROVIDER LOCATION On-site should be selected for visits conducted from your clinic location or adjoining [facility] hospital, academic office, or other nearby [facility] building. Off-site should be selected for all other provider locations, including home:212640}  Distant Location (provider location):  {virtual location provider:657342}  Platform used for Video Visit: {Virtual Visit Platforms:584185::\"Backand\"}    "

## 2024-08-15 NOTE — NURSING NOTE
Current patient location: [address] [place] [address]    Is the patient currently in the state of [place]? YES    Visit mode:VIDEO    If the visit is dropped, the patient can be reconnected by: VIDEO VISIT: Text to cell phone:   Telephone Information:   Mobile [phone]       Will anyone else be joining the visit? NO  (If patient encounters technical issues they should call [phone])    How would you like to obtain your AVS? MyChart    Are changes needed to the allergy or medication list? Pt stated no changes to allergies and Pt stated no med changes    Are refills needed on medications prescribed by this physician? NO    Rooming Documentation:  Questionnaire(s) completed      Reason for visit: RECHECK    Etelvina Lemus VVF

## 2024-08-15 NOTE — PROGRESS NOTES
"ASSESSMENT:  ADHD, combined type.   Generalized anxiety.  Major depressive disorder, single episode, moderate.     PLAN:   Diagnostic: No new diagnostic studies at this time.  Behavior management: Undoing thought patterns that reinforce anxiety and depression. Continue to develop healthy adult patterns toward connection balanced with independence.  Sleep: Monitor.  Physical Activity: Going to the gym regularly.  Nutrition: Healthy nourishment.  Relationships: Explore groups, clubs, and activities.   Creative expression: Creativity, natural world, non-human animal connection, giving to others, and physical activity can be avenues to deeply enjoying episodes of solitude.  Medication: Continue sertraline 100 mg daily. Continue Metadate ER to 20 mg daily for inattention.   Education: At [employer], pursuing a degree in marketing.  [facility] Collaboration: None.  Therapy: Weekly. Exposure response prevention or CBT may be particularly helpful.  Rehabilitation: None.  Community supports: None.   Follow-up: Monthly.    Hawk is a 24year 0month old last seen on July 11.   Estimated body mass index is 21.93 kg/m  as calculated from the following:    Height as of 5/16/24: 5' 10.5\" (179.1 cm).    Weight as of 5/16/24: 155 lb (70.3 kg).   No height and weight on file for this encounter.     This was a telemedicine visit with the physician located at home.     The EMR was reviewed on the day of the visit to review my most recent note and the intervening events since the last visit. Pertinent information from that review includes the following: No additional visits since last seen.    At today's visit, Hawk, is \"doing good just busy with moving in on campus. It's going to be a good change. I'm inherently a social being. Pushing myself to live on campus. I just got really sick of my current circumstances. I'm figuring out I just have to make these changes. I don't know why I didn't make this change at 20.     \"I'll give a personal example. I " "went out on a date with a chick I met at a mall. I had told myself I would never have a hook-up. But we were really vibing. We hooked up. And then I felt really bad afterwards. And I spent the next two days feeling bad.     \"Achieving financial independence is still really important to me. My mary and I are going to get into social media.     Topics for today: thought patterns, mood, school  Tasks for today: check med renewal    Next visit is 9/19, 10/17     30 minutes spent on the date of the encounter doing chart review, history and exam, documentation and further activities per the note. The longitudinal plan of care for the diagnosis(es)/condition(s) as documented were addressed during this visit. Due to the added complexity in care, I will continue to support Hawk in the subsequent management and with ongoing continuity of care.     Answers submitted by the patient for this visit:  Patient Health Questionnaire (Submitted on 8/15/2024)  If you checked off any problems, how difficult have these problems made it for you to do your work, take care of things at home, or get along with other people?: Somewhat difficult  PHQ9 TOTAL SCORE: 2    "

## 2024-08-21 NOTE — TELEPHONE ENCOUNTER
Dear Ghazal,    Thanks for your email and the heads up. I'd be happy to visit with you and Keith if you would like to talk about the parenting side of this. I think I've had a few cancellations in the past couple days and some space might have opened up in my schedule.    I'll look forward to seeing Hawk this week and, if he brings it up, we'll talk about it.    Best,    Dr. Bright   Subjective:   Patient ID: Alfredo Guerra is a 71 year old male.    HPI  Patient for f/u hypertension   Denies any chest pain shortness of breath or headaches.   Monitoring blood pressure at home and is below 135/85. Needs refill of medications.   Patient also complaining about the neck pain   No radiation of the pain in the arms   Pain on for months worse with certain moving the head  Also has low libido and would like to see urologist   History/Other:   Review of Systems  Constitutional: Negative.  Negative for activity change, appetite change, diaphoresis and fatigue.     Respiratory: Negative.  Negative for apnea, cough, chest tightness and shortness of breath.    Cardiovascular: Negative.  Negative for chest pain, palpitations and leg swelling.   Gastrointestinal: Negative.  Negative for abdominal pain.    Urology see hpi        MSK see hpi     Current Outpatient Medications   Medication Sig Dispense Refill    LISINOPRIL 20 MG Oral Tab TAKE 1 TABLET(20 MG) BY MOUTH DAILY 90 tablet 1     Allergies:No Known Allergies    Objective:   Physical Exam  Constitutional:       Appearance: He is well-developed.   Cardiovascular:      Rate and Rhythm: Normal rate and regular rhythm.      Heart sounds: Normal heart sounds.   Pulmonary:      Effort: Pulmonary effort is normal.      Breath sounds: Normal breath sounds.   Abdominal:      General: Bowel sounds are normal.      Palpations: Abdomen is soft.   Musculoskeletal:         General: Tenderness present. No swelling, deformity or signs of injury.      Right lower leg: No edema.      Left lower leg: No edema.   Neurological:      Mental Status: He is alert.      Deep Tendon Reflexes: Reflexes are normal and symmetric.         Assessment & Plan:   1. Screening for prostate cancer    2. Essential hypertension   Controlled cpm   3. Neck pain   Appears muscular - start physical therapy   4. Libido, decreased   See urologist        Orders Placed This Encounter   Procedures     Comp Metabolic Panel (14)    Lipid Panel    CBC With Differential With Platelet    PSA (Screening) [E]       Meds This Visit:  Requested Prescriptions      No prescriptions requested or ordered in this encounter       Imaging & Referrals:  PHYSICAL THERAPY - INTERNAL  UROLOGY - INTERNAL

## 2024-09-19 ENCOUNTER — VIRTUAL VISIT (OUTPATIENT)
Dept: PEDIATRICS | Facility: CLINIC | Age: 24
End: 2024-09-19
Payer: COMMERCIAL

## 2024-09-19 DIAGNOSIS — F32.1 CURRENT MODERATE EPISODE OF MAJOR DEPRESSIVE DISORDER WITHOUT PRIOR EPISODE (H): ICD-10-CM

## 2024-09-19 DIAGNOSIS — F90.2 ATTENTION DEFICIT HYPERACTIVITY DISORDER, COMBINED TYPE: Primary | ICD-10-CM

## 2024-09-19 DIAGNOSIS — F41.9 ANXIETY: ICD-10-CM

## 2024-09-19 PROCEDURE — 99214 OFFICE O/P EST MOD 30 MIN: CPT | Mod: 95 | Performed by: PEDIATRICS

## 2024-09-19 PROCEDURE — G2211 COMPLEX E/M VISIT ADD ON: HCPCS | Mod: 95 | Performed by: PEDIATRICS

## 2024-09-19 RX ORDER — METHYLPHENIDATE HYDROCHLORIDE 20 MG/1
20 CAPSULE, EXTENDED RELEASE ORAL DAILY
Qty: 30 CAPSULE | Refills: 0 | Status: SHIPPED | OUTPATIENT
Start: 2024-09-19

## 2024-09-19 RX ORDER — SERTRALINE HYDROCHLORIDE 100 MG/1
100 TABLET, FILM COATED ORAL DAILY
Qty: 30 TABLET | Refills: 3 | Status: SHIPPED | OUTPATIENT
Start: 2024-09-19

## 2024-09-19 ASSESSMENT — PATIENT HEALTH QUESTIONNAIRE - PHQ9
SUM OF ALL RESPONSES TO PHQ QUESTIONS 1-9: 1
SUM OF ALL RESPONSES TO PHQ QUESTIONS 1-9: 1
10. IF YOU CHECKED OFF ANY PROBLEMS, HOW DIFFICULT HAVE THESE PROBLEMS MADE IT FOR YOU TO DO YOUR WORK, TAKE CARE OF THINGS AT HOME, OR GET ALONG WITH OTHER PEOPLE: NOT DIFFICULT AT ALL

## 2024-09-19 NOTE — LETTER
"  9/19/2024      RE: Hawk Wiggins  [address]  [place] [place] [facility] [address]     Dear Colleague,    Thank you for referring your patient, Hawk Wiggins, to the [facility]. Please see a copy of my visit note below.    ASSESSMENT:  ADHD, combined type.   Generalized anxiety.  Major depressive disorder, single episode, moderate.     PLAN:   Diagnostic: No new diagnostic studies at this time.  Behavior management: Continue to work on thought patterns that reinforce anxiety and depression. Continue to develop healthy adult patterns toward connection balanced with independence.  Sleep: Monitor.  Physical Activity: Going to the gym regularly.  Nutrition: Varied diet.  Housing: Living in an apartment on campus for the first time.  Relationships: Explore groups, clubs, volunteering and activities. Reduce the barrier to connection with others through a shared interest and a consistent schedule. Normalized challenges with social connection for men in their 20s.  Creative expression: Creativity, natural world, non-human animal connection, giving to others, and physical activity can be avenues to deeply enjoying episodes of solitude.  Medication: Continue sertraline 100 mg daily. Continue Metadate ER to 20 mg daily for inattention.   Education: At [employer], pursuing a degree in marketing.  [facility] Collaboration: None.  Therapy: Re-engage in weekly therapy if depressive symptoms persist. Therapy services through [facility] should be convenient and available.  Rehabilitation: None.  Community supports: None.   Follow-up: Monthly. Transition to adult care before summer 2025.     Hawk is a 24year 1month old last seen on August 15.   Estimated body mass index is 21.93 kg/m  as calculated from the following:    Height as of 5/16/24: 5' 10.5\" (179.1 cm).    Weight as of 5/16/24: 155 lb (70.3 kg).   No height and weight on file for this encounter.     This was a telemedicine visit with the physician located at " "home.     The EMR was reviewed on the day of the visit to review my most recent note and the intervening events since the last visit. Pertinent information from that review includes the following: No additional visits since last seen.    \"It's so great to be in my own apartment. I felt trapped because I was in my mom's home. And this is just good for my soul.     \"My biggest struggle is social connection. Covid took a lot of things away from me. The freshman events were all canceled. A lot of kids meet that way.     \"I wanted to join this swing dancing group but I have to be in school.    \"I'm in this weird functional freeze mode. I'm trying to figure how to just get simple tasks done.     \"It feels monumental to just brush my teeth. I'm not sure where that's coming from.     \"I'm working on my self value. I can meet people but getting those relationships to last just doesn't work. I try to make plans and they just won't hit me back. Even a low maintenance hang out is hard to arrange.     Topics for today: med regimen, mood, school, transfer to adult care  Tasks for today: check med renewal    Visits are set up monthly through December 35 minutes spent on the date of the encounter doing chart review, history and exam, documentation and further activities per the note. The longitudinal plan of care for the diagnosis(es)/condition(s) as documented were addressed during this visit. Due to the added complexity in care, I will continue to support Hawk in the subsequent management and with ongoing continuity of care.        Again, thank you for allowing me to participate in the care of your patient.      Sincerely,    Crystal Chirinos MD  "

## 2024-09-19 NOTE — PROGRESS NOTES
"ASSESSMENT:  ADHD, combined type.   Generalized anxiety.  Major depressive disorder, single episode, moderate.     PLAN:   Diagnostic: No new diagnostic studies at this time.  Behavior management: Continue to work on thought patterns that reinforce anxiety and depression. Continue to develop healthy adult patterns toward connection balanced with independence.  Sleep: Monitor.  Physical Activity: Going to the gym regularly.  Nutrition: Varied diet.  Housing: Living in an apartment on campus for the first time.  Relationships: Explore groups, clubs, volunteering and activities. Reduce the barrier to connection with others through a shared interest and a consistent schedule. Normalized challenges with social connection for men in their 20s.  Creative expression: Creativity, natural world, non-human animal connection, giving to others, and physical activity can be avenues to deeply enjoying episodes of solitude.  Medication: Continue sertraline 100 mg daily. Continue Metadate ER to 20 mg daily for inattention.   Education: At [employer], pursuing a degree in marketing.  [facility] Collaboration: None.  Therapy: Re-engage in weekly therapy if depressive symptoms persist. Therapy services through [facility] should be convenient and available.  Rehabilitation: None.  Community supports: None.   Follow-up: Monthly. Transition to adult care before summer 2025.     Hawk is a 24year 1month old last seen on August 15.   Estimated body mass index is 21.93 kg/m  as calculated from the following:    Height as of 5/16/24: 5' 10.5\" (179.1 cm).    Weight as of 5/16/24: 155 lb (70.3 kg).   No height and weight on file for this encounter.     This was a telemedicine visit with the physician located at home.     The EMR was reviewed on the day of the visit to review my most recent note and the intervening events since the last visit. Pertinent information from that review includes the following: No additional visits since last seen.    \"It's so great " "to be in my own apartment. I felt trapped because I was in my mom's home. And this is just good for my soul.     \"My biggest struggle is social connection. Covid took a lot of things away from me. The freshman events were all canceled. A lot of kids meet that way.     \"I wanted to join this swing dancing group but I have to be in school.    \"I'm in this weird functional freeze mode. I'm trying to figure how to just get simple tasks done.     \"It feels monumental to just brush my teeth. I'm not sure where that's coming from.     \"I'm working on my self value. I can meet people but getting those relationships to last just doesn't work. I try to make plans and they just won't hit me back. Even a low maintenance hang out is hard to arrange.     Topics for today: med regimen, mood, school, transfer to adult care  Tasks for today: check med renewal    Visits are set up monthly through December     35 minutes spent on the date of the encounter doing chart review, history and exam, documentation and further activities per the note. The longitudinal plan of care for the diagnosis(es)/condition(s) as documented were addressed during this visit. Due to the added complexity in care, I will continue to support Hawk in the subsequent management and with ongoing continuity of care.      "

## 2024-10-17 ENCOUNTER — VIRTUAL VISIT (OUTPATIENT)
Dept: PEDIATRICS | Facility: CLINIC | Age: 24
End: 2024-10-17
Payer: COMMERCIAL

## 2024-10-17 DIAGNOSIS — F41.9 ANXIETY: ICD-10-CM

## 2024-10-17 DIAGNOSIS — F32.1 CURRENT MODERATE EPISODE OF MAJOR DEPRESSIVE DISORDER WITHOUT PRIOR EPISODE (H): ICD-10-CM

## 2024-10-17 DIAGNOSIS — F90.2 ATTENTION DEFICIT HYPERACTIVITY DISORDER, COMBINED TYPE: Primary | ICD-10-CM

## 2024-10-17 PROCEDURE — 99215 OFFICE O/P EST HI 40 MIN: CPT | Mod: 95 | Performed by: PEDIATRICS

## 2024-10-17 PROCEDURE — G2211 COMPLEX E/M VISIT ADD ON: HCPCS | Mod: 95 | Performed by: PEDIATRICS

## 2024-10-17 ASSESSMENT — PATIENT HEALTH QUESTIONNAIRE - PHQ9
SUM OF ALL RESPONSES TO PHQ QUESTIONS 1-9: 4
SUM OF ALL RESPONSES TO PHQ QUESTIONS 1-9: 4
10. IF YOU CHECKED OFF ANY PROBLEMS, HOW DIFFICULT HAVE THESE PROBLEMS MADE IT FOR YOU TO DO YOUR WORK, TAKE CARE OF THINGS AT HOME, OR GET ALONG WITH OTHER PEOPLE: SOMEWHAT DIFFICULT

## 2024-10-17 ASSESSMENT — PAIN SCALES - GENERAL: PAINLEVEL: NO PAIN (0)

## 2024-10-17 NOTE — LETTER
"  10/17/2024      RE: Hawk Wiggins  [address]  [place] [place] [facility] [address]     Dear Colleague,    Thank you for referring your patient, Hawk Wiggins, to the [facility]. Please see a copy of my visit note below.    ASSESSMENT:  ADHD, combined type.   Generalized anxiety.  Major depressive disorder, single episode, moderate.     PLAN:   Diagnostic: No new diagnostic studies at this time.  Behavior management: Continue to work on thought patterns that reinforce anxiety and depression.   Sleep: Monitor.  Physical Activity: Going to the gym regularly.  Nutrition: Varied diet.  Housing: Living in an apartment on campus for the first time in fall 2024.  Relationships: Explore groups, clubs, volunteering and activities. Reduce the barrier to connection with others through a shared interest and a consistent schedule.   Creative expression: Creativity, natural world, non-human animal connection, giving to others, and physical activity can be avenues to deeply enjoying episodes of solitude.  Medication: Continue sertraline 100 mg daily. Continue Metadate ER 20 mg daily for inattention.   Education: At [employer], pursuing a degree in marketing.  [facility] Collaboration: None.  Therapy: Hawk will initiate weekly therapy. Therapy services through [facility] should be convenient and available.  Rehabilitation: None.  Community supports: None.   Follow-up: Monthly. Transition to adult care before summer 2025.       Hawk is a 24year 2month old last seen on September 19.   Estimated body mass index is 21.93 kg/m  as calculated from the following:    Height as of 5/16/24: 5' 10.5\" (179.1 cm).    Weight as of 5/16/24: 155 lb (70.3 kg).   No height and weight on file for this encounter.     This was a telemedicine visit with the physician located at home.     The EMR was reviewed on the day of the visit to review my most recent note and the intervening events since the last visit. Pertinent information from " "that review includes the following: No additional visits since last seen.    Today, I visited with Hawk in his apartment.    -How are you? \"That's a complicated question. Give me a second to put this together. . . I've been lacking on some priorities of mine because of how I've been feeling the last couple of weeks, which has been tough. I've been trying to find ways to keep my brain occupied so I don't give too much power away to my brain.     \"Homework, cleaning [is hard]    \"I get to class, I get out of the apartment.     \"I don't know why this is happening.     \"I feel like it's. . . I feel like there's a lack of social connection that is making me sad. Another piece might be. . . . Um. . . Feeling like I'm not where I ought to be at this point in my life, I should be making a little bit more [money], I feel like I should just be making more money period, I've been trying to find opportunities to make some extra money and it's just not working.     \"I wish I didn't have     Observation: Responses slower than usual today, less animated than recent visits, tone was less varied, mood seemed flat.     Topics for today: therapy, mood, social connection  Tasks for today: check med renewal    Next visit is 11/21, 12/12     42 minutes spent on the date of the encounter doing chart review, history and exam, documentation and further activities per the note. The longitudinal plan of care for the diagnosis(es)/condition(s) as documented were addressed during this visit. Due to the added complexity in care, I will continue to support Hawk in the subsequent management and with ongoing continuity of care.      Answers submitted by the patient for this visit:  Patient Health Questionnaire (Submitted on 10/17/2024)  If you checked off any problems, how difficult have these problems made it for you to do your work, take care of things at home, or get along with other people?: Somewhat difficult  PHQ9 TOTAL SCORE: 4      Again, " thank you for allowing me to participate in the care of your patient.      Sincerely,    Crystal Chirinos MD

## 2024-10-17 NOTE — NURSING NOTE
Current patient location: [address] [place] [id number]    Is the patient currently in the state of [place]? YES    Visit mode:VIDEO    If the visit is dropped, the patient can be reconnected by: VIDEO VISIT: Text to cell phone:   Telephone Information:   Mobile [phone]       Will anyone else be joining the visit? NO  (If patient encounters technical issues they should call [phone]    Are changes needed to the allergy or medication list? No    Are refills needed on medications prescribed by this physician? NO    Rooming Documentation:  Questionnaire(s) completed    Reason for visit: OSORIO WOODSF

## 2024-10-17 NOTE — PROGRESS NOTES
"ASSESSMENT:  ADHD, combined type.   Generalized anxiety.  Major depressive disorder, single episode, moderate.     PLAN:   Diagnostic: No new diagnostic studies at this time.  Behavior management: Continue to work on thought patterns that reinforce anxiety and depression.   Sleep: Monitor.  Physical Activity: Going to the gym regularly.  Nutrition: Varied diet.  Housing: Living in an apartment on campus for the first time in fall 2024.  Relationships: Explore groups, clubs, volunteering and activities. Reduce the barrier to connection with others through a shared interest and a consistent schedule.   Creative expression: Creativity, natural world, non-human animal connection, giving to others, and physical activity can be avenues to deeply enjoying episodes of solitude.  Medication: Continue sertraline 100 mg daily. Continue Metadate ER 20 mg daily for inattention.   Education: At [employer], pursuing a degree in marketing.  [facility] Collaboration: None.  Therapy: Hawk will initiate weekly therapy. Therapy services through [facility] should be convenient and available.  Rehabilitation: None.  Community supports: None.   Follow-up: Monthly. Transition to adult care before summer 2025.       Hawk is a 24year 2month old last seen on September 19.   Estimated body mass index is 21.93 kg/m  as calculated from the following:    Height as of 5/16/24: 5' 10.5\" (179.1 cm).    Weight as of 5/16/24: 155 lb (70.3 kg).   No height and weight on file for this encounter.     This was a telemedicine visit with the physician located at home.     The EMR was reviewed on the day of the visit to review my most recent note and the intervening events since the last visit. Pertinent information from that review includes the following: No additional visits since last seen.    Today, I visited with Hawk in his apartment.    -How are you? \"That's a complicated question. Give me a second to put this together. . . I've been lacking on some priorities of " "mine because of how I've been feeling the last couple of weeks, which has been tough. I've been trying to find ways to keep my brain occupied so I don't give too much power away to my brain.     \"Homework, cleaning [is hard]    \"I get to class, I get out of the apartment.     \"I don't know why this is happening.     \"I feel like it's. . . I feel like there's a lack of social connection that is making me sad. Another piece might be. . . . Um. . . Feeling like I'm not where I ought to be at this point in my life, I should be making a little bit more [money], I feel like I should just be making more money period, I've been trying to find opportunities to make some extra money and it's just not working.     \"I wish I didn't have     Observation: Responses slower than usual today, less animated than recent visits, tone was less varied, mood seemed flat.     Topics for today: therapy, mood, social connection  Tasks for today: check med renewal    Next visit is 11/21, 12/12     42 minutes spent on the date of the encounter doing chart review, history and exam, documentation and further activities per the note. The longitudinal plan of care for the diagnosis(es)/condition(s) as documented were addressed during this visit. Due to the added complexity in care, I will continue to support Hawk in the subsequent management and with ongoing continuity of care.      Answers submitted by the patient for this visit:  Patient Health Questionnaire (Submitted on 10/17/2024)  If you checked off any problems, how difficult have these problems made it for you to do your work, take care of things at home, or get along with other people?: Somewhat difficult  PHQ9 TOTAL SCORE: 4    "

## 2024-11-21 ENCOUNTER — VIRTUAL VISIT (OUTPATIENT)
Dept: PEDIATRICS | Facility: CLINIC | Age: 24
End: 2024-11-21
Payer: COMMERCIAL

## 2024-11-21 DIAGNOSIS — F90.2 ATTENTION DEFICIT HYPERACTIVITY DISORDER, COMBINED TYPE: Primary | ICD-10-CM

## 2024-11-21 DIAGNOSIS — F90.2 ATTENTION DEFICIT HYPERACTIVITY DISORDER, COMBINED TYPE: ICD-10-CM

## 2024-11-21 DIAGNOSIS — F32.1 CURRENT MODERATE EPISODE OF MAJOR DEPRESSIVE DISORDER WITHOUT PRIOR EPISODE (H): ICD-10-CM

## 2024-11-21 DIAGNOSIS — F41.9 ANXIETY: Primary | ICD-10-CM

## 2024-11-21 RX ORDER — METHYLPHENIDATE HYDROCHLORIDE 10 MG/1
10 TABLET ORAL DAILY
Qty: 30 TABLET | Refills: 0 | Status: CANCELLED | OUTPATIENT
Start: 2024-11-21

## 2024-11-21 RX ORDER — METHYLPHENIDATE HYDROCHLORIDE 20 MG/1
20 CAPSULE, EXTENDED RELEASE ORAL DAILY
Qty: 30 CAPSULE | Refills: 0 | Status: SHIPPED | OUTPATIENT
Start: 2024-11-21

## 2024-11-21 RX ORDER — METHYLPHENIDATE HYDROCHLORIDE 10 MG/1
10 TABLET ORAL DAILY
Qty: 30 TABLET | Refills: 0 | Status: SHIPPED | OUTPATIENT
Start: 2024-11-21 | End: 2024-12-21

## 2024-11-21 RX ORDER — METHYLPHENIDATE HYDROCHLORIDE 10 MG/1
10 TABLET ORAL DAILY
Qty: 30 TABLET | Refills: 0 | Status: SHIPPED | OUTPATIENT
Start: 2024-12-21 | End: 2025-01-20

## 2024-11-21 RX ORDER — METHYLPHENIDATE HYDROCHLORIDE 10 MG/1
10 TABLET ORAL DAILY
Qty: 30 TABLET | Refills: 0 | Status: SHIPPED | OUTPATIENT
Start: 2025-01-20 | End: 2025-02-19

## 2024-11-21 ASSESSMENT — PATIENT HEALTH QUESTIONNAIRE - PHQ9
SUM OF ALL RESPONSES TO PHQ QUESTIONS 1-9: 13
SUM OF ALL RESPONSES TO PHQ QUESTIONS 1-9: 13
10. IF YOU CHECKED OFF ANY PROBLEMS, HOW DIFFICULT HAVE THESE PROBLEMS MADE IT FOR YOU TO DO YOUR WORK, TAKE CARE OF THINGS AT HOME, OR GET ALONG WITH OTHER PEOPLE: VERY DIFFICULT

## 2024-11-21 ASSESSMENT — PAIN SCALES - GENERAL: PAINLEVEL_OUTOF10: NO PAIN (0)

## 2024-11-21 NOTE — LETTER
"  11/21/2024      RE: Hawk Wiggins  [address]  [place] [address]     Dear Colleague,    Thank you for referring your patient, Hawk Wiggins, to the [facility]. Please see a copy of my visit note below.    ASSESSMENT:  ADHD, combined type.   Generalized anxiety.  Major depressive disorder, single episode, moderate.     PLAN:   Diagnostic: No new diagnostic studies at this time.  Behavior management: Continue to work on thought patterns that reinforce anxiety and depression. Discussed treatment options if interested in psilocybin treatment for depression. Therapeutic options discussed in patient instructions.   Sleep: Monitor.  Physical Activity: Going to the gym regularly.  Nutrition: Varied diet.  Housing: Living in an apartment on campus for the first time in fall 2024.  Relationships: Explore groups, clubs, volunteering and activities. Reduce the barrier to connection with others through a shared interest and a consistent schedule.   Creative expression: Creativity, natural world, non-human animal connection, giving to others, and physical activity can be avenues to deeply enjoying episodes of solitude.  Medication: Continue sertraline 100 mg daily. Hawk may discontinue his sertraline to see if he can tolerate being off of it. Continue Metadate ER 20 mg daily for inattention. Ritalin 10 mg booster dose as needed.  Education: At [employer], pursuing a degree in marketing.  [facility] Collaboration: None.  Therapy: Weekly therapy. Somewhat discouraged with the progress he's making in therapy.   Rehabilitation: None.  Community supports: None.   Follow-up: Monthly. Information provided today about interventional psychiatry and national trials of psilocybin.     Hawk is a 24year 3month old last seen on October 17.   Estimated body mass index is 21.93 kg/m  as calculated from the following:    Height as of 5/16/24: 5' 10.5\" (179.1 cm).    Weight as of 5/16/24: 155 lb (70.3 kg). " "  No height and weight on file for this encounter.     This was a telemedicine visit with the physician located at home.     The EMR was reviewed on the day of the visit to review my most recent note and the intervening events since the last visit. Pertinent information from that review includes the following: request to add a short acting booster dose of methylphenidate    Today, Hawk said, \"I have a whopper of a situation. I've been struggling with depression for the last few months. It has to do with my financial situation.     \"I'm thinking about taking small doses of psychedelic mushrooms. I've been doing a lot of research.     Topics for today: medication, mood  Tasks for today: renew meds, January and February visits    Next visit is 12/12     41 minutes spent on the date of the encounter doing chart review, history and exam, documentation and further activities per the note. The longitudinal plan of care for the diagnosis(es)/condition(s) as documented were addressed during this visit. Due to the added complexity in care, I will continue to support Hawk in the subsequent management and with ongoing continuity of care.     Answers submitted by the patient for this visit:  Patient Health Questionnaire (Submitted on 11/21/2024)  If you checked off any problems, how difficult have these problems made it for you to do your work, take care of things at home, or get along with other people?: Very difficult  PHQ9 TOTAL SCORE: 13      Again, thank you for allowing me to participate in the care of your patient.      Sincerely,    Crystal Chirinos MD  "

## 2024-11-21 NOTE — PATIENT INSTRUCTIONS
Here are more details on the alternative treatments for refractory depression available at the [facility]:  1. Electroconvulsive Therapy (ECT)  Procedure: ECT involves brief electrical stimulation of the brain while the patient is under anesthesia. It is typically administered two to three times a week for a total of 6-12 sessions.  Effectiveness: ECT is highly effective for severe depression, especially when other treatments have failed. It can also be used for bipolar disorder and schizophrenia.  2. Transcranial Magnetic Stimulation (TMS)  Procedure: TMS uses magnetic fields to stimulate nerve cells in the brain. It is a non-invasive procedure performed while the patient is awake.  Effectiveness: TMS is effective for patients who have not responded to antidepressants. It is usually administered five times a week for 4-6 weeks.  3. Ketamine Therapy  Procedure: Ketamine is administered intravenously in a controlled medical setting. The treatment typically involves a series of infusions over several weeks.  Effectiveness: Ketamine has shown rapid antidepressant effects, often within hours, making it a valuable option for severe, treatment-resistant depression.  4. Vagus Nerve Stimulation (VNS)  Procedure: VNS involves implanting a device under the skin that sends electrical impulses to the vagus nerve. The device is usually implanted in the chest, with a wire running to the vagus nerve in the neck.  Effectiveness: VNS can be effective for patients who have not responded to other treatments, though it may take several months to see the full benefits.  5. Advanced Pharmacotherapy  Approach: This involves the use of novel medications and combination therapies tailored to the patient's specific needs. It may include off-label use of medications or new drugs not yet widely available.  Effectiveness: Advanced pharmacotherapy can provide options for patients who have not responded to standard treatments.  6.  Evidence-Based Psychotherapy  Types: Includes cognitive-behavioral therapy (CBT), dialectical behavior therapy (DBT), and other forms of psychotherapy.  Effectiveness: Psychotherapy can be an essential part of a comprehensive treatment plan, helping patients develop coping strategies and address underlying issues.  Accessing These Services  To access these treatments, you can contact the [facility]'s Interventional Psychiatry Program. They offer consultations to determine the most appropriate treatment plan based on individual patient needs.    There are several national trials of psilocybin that adults can participate in. Here are a few notable ones:  [facility]:  Study: A national multisite randomized, placebo-controlled trial of psilocybin for major depressive disorder.  Details: This study aims to evaluate the efficacy and safety of psilocybin in treating major depressive disorder. Participants receive psilocybin in a controlled setting with supportive [id number].  [facility]:  Study: A Phase 3, randomized, double-blind, multicenter study.  Details: This trial is focused on determining the safety and effectiveness of psilocybin in adults diagnosed with major depressive disorder. The study involves multiple centers and aims to provide alternative treatment options for depression3.  [facility] ([facility]):  Study: Various clinical trials studying psilocybin.  Details: NCI supports several trials investigating the use of psilocybin for different conditions, including mental health disorders and cancer-related symptoms4.  These trials are part of ongoing research to explore the potential therapeutic benefits of psilocybin under controlled and supervised conditions.

## 2024-11-21 NOTE — NURSING NOTE
Current patient location: [address] [place] [address]    Is the patient currently in the state of [place]? YES    Visit mode:VIDEO    If the visit is dropped, the patient can be reconnected by:VIDEO VISIT: Text to cell phone:   Telephone Information:   Mobile [phone]       Will anyone else be joining the visit? NO  (If patient encounters technical issues they should call [phone]    Are changes needed to the allergy or medication list? No    Are refills needed on medications prescribed by this physician? YES    Rooming Documentation:  Questionnaire(s) completed    Reason for visit: RECHECK    Ashley WOODSF

## 2024-11-21 NOTE — PROGRESS NOTES
"ASSESSMENT:  ADHD, combined type.   Generalized anxiety.  Major depressive disorder, single episode, moderate.     PLAN:   Diagnostic: No new diagnostic studies at this time.  Behavior management: Continue to work on thought patterns that reinforce anxiety and depression. Discussed treatment options if interested in psilocybin treatment for depression. Therapeutic options discussed in patient instructions.   Sleep: Monitor.  Physical Activity: Going to the gym regularly.  Nutrition: Varied diet.  Housing: Living in an apartment on campus for the first time in fall 2024.  Relationships: Explore groups, clubs, volunteering and activities. Reduce the barrier to connection with others through a shared interest and a consistent schedule.   Creative expression: Creativity, natural world, non-human animal connection, giving to others, and physical activity can be avenues to deeply enjoying episodes of solitude.  Medication: Continue sertraline 100 mg daily. Hawk may discontinue his sertraline to see if he can tolerate being off of it. Continue Metadate ER 20 mg daily for inattention. Ritalin 10 mg booster dose as needed.  Education: At [employer], pursuing a degree in marketing.  [facility] Collaboration: None.  Therapy: Weekly therapy. Somewhat discouraged with the progress he's making in therapy.   Rehabilitation: None.  Community supports: None.   Follow-up: Monthly. Information provided today about interventional psychiatry and national trials of psilocybin.     Hawk is a 24year 3month old last seen on October 17.   Estimated body mass index is 21.93 kg/m  as calculated from the following:    Height as of 5/16/24: 5' 10.5\" (179.1 cm).    Weight as of 5/16/24: 155 lb (70.3 kg).   No height and weight on file for this encounter.     This was a telemedicine visit with the physician located at home.     The EMR was reviewed on the day of the visit to review my most recent note and the intervening events since the last visit. " "Pertinent information from that review includes the following: request to add a short acting booster dose of methylphenidate    Today, Hawk said, \"I have a whopper of a situation. I've been struggling with depression for the last few months. It has to do with my financial situation.     \"I'm thinking about taking small doses of psychedelic mushrooms. I've been doing a lot of research.     Topics for today: medication, mood  Tasks for today: renew meds, January and February visits    Next visit is 12/12     41 minutes spent on the date of the encounter doing chart review, history and exam, documentation and further activities per the note. The longitudinal plan of care for the diagnosis(es)/condition(s) as documented were addressed during this visit. Due to the added complexity in care, I will continue to support Hawk in the subsequent management and with ongoing continuity of care.     Answers submitted by the patient for this visit:  Patient Health Questionnaire (Submitted on 11/21/2024)  If you checked off any problems, how difficult have these problems made it for you to do your work, take care of things at home, or get along with other people?: Very difficult  PHQ9 TOTAL SCORE: 13    "

## 2024-12-05 NOTE — PATIENT INSTRUCTIONS
"      Thank you for choosing the [facility] s Developmental and Behavioral Pediatrics Department for your care!     To schedule appointments please contact the [facility] at [phone].     For medication refills please contact your child's pharmacy.  Your pharmacy will direct you to contact the clinic if there are no refills left or, for \"schedule II\" (controlled substances), if there are no remaining prescription orders.  If you have been directed by your pharmacy to contact the clinic for a prescription renewal, please call the [facility] [phone] or contact us via your Epic MyChart account.  Please allow 5-7 days for your refill request to be processed and sent to your pharmacy.      For behavioral emergencies (immediate concern for your child s safety or the safety of another) please contact the Behavioral Emergency Center at [phone], go to your local Emergency Department or call 911.       For non-emergencies contact the [facility] at [phone] or reach out to us via [employer]. Please allow 3 business days for a response.    1. Continue with your weekly group and individual therapy. I think this is a good fit for your needs at this time. I will look forward to hearing more about how it is having an impact on your depression.   2. I will renew your medication so you can continue to have it available.   3. Sounds like you're looking forward to your trip to [place]. I hope you have a good time.   4. Your goals of enjoying life again and being able to take pleasure in usual activities, reconnecting with friends, having increased motivation and improved appetite are all worthy goals. It is likely that depression is affecting all of them. These will be helpful for us to track overtime to see how they change in response to your treatment.  " No

## 2024-12-09 DIAGNOSIS — F32.1 CURRENT MODERATE EPISODE OF MAJOR DEPRESSIVE DISORDER WITHOUT PRIOR EPISODE (H): ICD-10-CM

## 2024-12-09 RX ORDER — SERTRALINE HYDROCHLORIDE 100 MG/1
100 TABLET, FILM COATED ORAL DAILY
Qty: 30 TABLET | Refills: 3 | Status: CANCELLED | OUTPATIENT
Start: 2024-12-09

## 2024-12-09 NOTE — TELEPHONE ENCOUNTER
Last seen: 11/21/2024  RTC: Monthly   Cancel: None  No-show: None  Next appt: 12/12/2024  Last filled: 11/15/2024 (Disp 30, 30d Supply)     Incoming refill from pharmacy via fax by pharmacy    Medication requested:   Pending Prescriptions:                       Disp   Refills    sertraline (ZOLOFT) 100 MG tablet         30 tab*3            Sig: Take 1 tablet (100 mg) by mouth daily.        From chart note:   Continue sertraline 100 mg daily     Refill sent to [facility] for final review.

## 2024-12-12 ENCOUNTER — VIRTUAL VISIT (OUTPATIENT)
Dept: PEDIATRICS | Facility: CLINIC | Age: 24
End: 2024-12-12
Payer: COMMERCIAL

## 2024-12-12 DIAGNOSIS — F32.1 CURRENT MODERATE EPISODE OF MAJOR DEPRESSIVE DISORDER WITHOUT PRIOR EPISODE (H): ICD-10-CM

## 2024-12-12 DIAGNOSIS — F90.2 ATTENTION DEFICIT HYPERACTIVITY DISORDER, COMBINED TYPE: Primary | ICD-10-CM

## 2024-12-12 DIAGNOSIS — F41.9 ANXIETY: ICD-10-CM

## 2024-12-12 RX ORDER — SERTRALINE HYDROCHLORIDE 100 MG/1
100 TABLET, FILM COATED ORAL DAILY
Qty: 30 TABLET | Refills: 3 | Status: SHIPPED | OUTPATIENT
Start: 2024-12-12

## 2024-12-12 RX ORDER — SERTRALINE HYDROCHLORIDE 100 MG/1
100 TABLET, FILM COATED ORAL DAILY
Qty: 30 TABLET | Refills: 0 | Status: CANCELLED | OUTPATIENT
Start: 2024-12-12

## 2024-12-12 ASSESSMENT — PAIN SCALES - GENERAL: PAINLEVEL_OUTOF10: NO PAIN (0)

## 2024-12-12 NOTE — LETTER
"  12/12/2024      RE: Hawk Wiggins  [address]  [place] [address]     Dear Colleague,    Thank you for referring your patient, Hawk Wiggins, to the [facility]. Please see a copy of my visit note below.    ASSESSMENT:  ADHD, combined type.   Generalized anxiety.  Major depressive disorder, single episode, moderate.     PLAN:   Diagnostic: No new diagnostic studies at this time.  Behavior management: Discussed intent to break up with girlfriend. Will ask mother to attend next visit to give Hawk a chance to talk through importance to him of maternal self-care.  Sleep: Monitor.  Physical Activity: Going to the gym regularly.  Nutrition: Varied diet.  Housing: Living in an apartment on campus for the first time in fall 2024.  Relationships: Explore groups, clubs, volunteering and activities. Reduce the barrier to connection with others through a shared interest and a consistent schedule.   Creative expression: Creativity, natural world, non-human animal connection, giving to others, and physical activity can be avenues to deeply enjoying episodes of solitude.  Medication: Continue sertraline 100 mg daily.Continue Metadate ER 20 mg daily for inattention. Ritalin 10 mg booster dose as needed.  Education: At [employer], pursuing a degree in marketing.  [facility] Collaboration: None.  Therapy: Weekly therapy. Good therapeutic connection with new therapist.  Rehabilitation: None.  Community supports: None.   Follow-up: Monthly.       Hawk is a 24year 4month old last seen on November 21.   Estimated body mass index is 21.93 kg/m  as calculated from the following:    Height as of 5/16/24: 5' 10.5\" (179.1 cm).    Weight as of 5/16/24: 155 lb (70.3 kg).   No height and weight on file for this encounter.     This was a telemedicine visit with the physician located at home.     The EMR was reviewed on the day of the visit to review my most recent note and the intervening events since the last " "visit. Pertinent information from that review includes the following: No additional visits since last seen.    Today, Hawk says \"I'm a mixed bag right now. I switched therapists. She was acting too much like a therapist and was too cookie cutter. It just wasn't working.     \"I got a mukul therapist. Right away we got along and he acted like a human being.     \"Family dynamics are tearing apart at the seams. My mom is super overworked and overstressed. She's not super satisfied with her personal relationships. This girlfriend I've had for a couple months, it's just not working.     48 minutes spent on the date of the encounter doing chart review, history and exam, documentation and further activities per the note. The longitudinal plan of care for the diagnosis(es)/condition(s) as documented were addressed during this visit. Due to the added complexity in care, I will continue to support Hawk in the subsequent management and with ongoing continuity of care.        Again, thank you for allowing me to participate in the care of your patient.      Sincerely,    Crystal Chirinos MD  "

## 2024-12-12 NOTE — TELEPHONE ENCOUNTER
Last seen: 11/21/24  RTC: Monthly. Information provided today about interventional psychiatry and national trials of psilocybin   Cancel: 0  No-show: 0  Next appt: 12/12/24  Last filled: 11/15/24 for a 30d/s     Incoming refill from pharmacy via fax by pharmacy    Medication requested:   Pending Prescriptions:                       Disp   Refills    sertraline (ZOLOFT) 100 MG tablet         30 tab*3            Sig: Take 1 tablet (100 mg) by mouth daily.      From chart note:     Continue sertraline 100 mg daily. Hawk may discontinue his sertraline to see if he can tolerate being off of it.     Refill sent to [facility] for final review.

## 2024-12-12 NOTE — PROGRESS NOTES
"ASSESSMENT:  ADHD, combined type.   Generalized anxiety.  Major depressive disorder, single episode, moderate.     PLAN:   Diagnostic: No new diagnostic studies at this time.  Behavior management: Discussed intent to break up with girlfriend. Will ask mother to attend next visit to give Hawk a chance to talk through importance to him of maternal self-care.  Sleep: Monitor.  Physical Activity: Going to the gym regularly.  Nutrition: Varied diet.  Housing: Living in an apartment on campus for the first time in fall 2024.  Relationships: Explore groups, clubs, volunteering and activities. Reduce the barrier to connection with others through a shared interest and a consistent schedule.   Creative expression: Creativity, natural world, non-human animal connection, giving to others, and physical activity can be avenues to deeply enjoying episodes of solitude.  Medication: Continue sertraline 100 mg daily.Continue Metadate ER 20 mg daily for inattention. Ritalin 10 mg booster dose as needed.  Education: At [employer], pursuing a degree in marketing.  [facility] Collaboration: None.  Therapy: Weekly therapy. Good therapeutic connection with new therapist.  Rehabilitation: None.  Community supports: None.   Follow-up: Monthly.       Hawk is a 24year 4month old last seen on November 21.   Estimated body mass index is 21.93 kg/m  as calculated from the following:    Height as of 5/16/24: 5' 10.5\" (179.1 cm).    Weight as of 5/16/24: 155 lb (70.3 kg).   No height and weight on file for this encounter.     This was a telemedicine visit with the physician located at home.     The EMR was reviewed on the day of the visit to review my most recent note and the intervening events since the last visit. Pertinent information from that review includes the following: No additional visits since last seen.    Today, Hawk says \"I'm a mixed bag right now. I switched therapists. She was acting too much like a therapist and was too cookie cutter. It " "just wasn't working.     \"I got a mukul therapist. Right away we got along and he acted like a human being.     \"Family dynamics are tearing apart at the seams. My mom is super overworked and overstressed. She's not super satisfied with her personal relationships. This girlfriend I've had for a couple months, it's just not working.     48 minutes spent on the date of the encounter doing chart review, history and exam, documentation and further activities per the note. The longitudinal plan of care for the diagnosis(es)/condition(s) as documented were addressed during this visit. Due to the added complexity in care, I will continue to support Hawk in the subsequent management and with ongoing continuity of care.      "

## 2024-12-12 NOTE — NURSING NOTE
Current patient location: [address] [place] [id number]    Is the patient currently in the state of [place]? YES    Visit mode:VIDEO    If the visit is dropped, the patient can be reconnected by:VIDEO VISIT: Text to cell phone:   Telephone Information:   Mobile [phone]       Will anyone else be joining the visit? NO  (If patient encounters technical issues they should call [phone]    Are changes needed to the allergy or medication list? No    Are refills needed on medications prescribed by this physician? NO    Rooming Documentation:  Unable to complete questionnaire(s) due to time    Reason for visit: RECHECK    Leelee GALVAN

## 2024-12-12 NOTE — PATIENT INSTRUCTIONS
**For crisis resources, please see the information at the end of this document**   Patient Education    Thank you for coming to the [facility].     Lab Testing:  If you had lab testing today and your results are reassuring or normal they will be mailed to you or sent through [employer] within 7 days. If the lab tests need quick action we will call you with the results. The phone number we will call with results is # [phone]. If this is not the best number please call our clinic and change the number.     Medication Refills:  If you need any refills please call your pharmacy and they will contact us. Our fax number for refills is [phone].   Three business days of notice are needed for general medication refill requests.   Five business days of notice are needed for controlled substance refill requests.   If you need to change to a different pharmacy, please contact the new pharmacy directly. The new pharmacy will help you get your medications transferred.     Contact Us:  Please call [phone] during business hours (8-5:00 M-F).   If you have medication related questions after clinic hours, or on the weekend, please call [phone].     Financial Assistance [phone]   Medical Records [phone]       MENTAL HEALTH CRISIS RESOURCES:  For a emergency help, please call 911 or go to the nearest Emergency Department.     Emergency Walk-In Options:   EmPATH Unit @ [facility] ([place]): [phone] - Specialized mental health emergency area designed to be calming  [facility] ([place]): [phone]  [facility] ([place]): [phone]  [facility]): [phone]    [facility] Crisis Information:   [place]: [phone]  [place]: [phone]  Greg (MEHNAZ) - Adult: [phone]     Child: [phone]  [place] - Adult: [phone]     Child: [phone]  [place]: [phone]  List of all [place]  [facility] resources:   https://[place].gov/dhs/people-we-serve/adults/health-care/mental-health/resources/crisis-contacts.jsp    National Crisis Information:   Crisis Text Line: Text  [place]  to 838793  Suicide & Crisis Lifeline: 988  National Suicide Prevention Lifeline: 1-462-438-TALK (6-310-159-6660)       For online chat options, visit https://suicidepreventionlifeline.org/chat/  Poison Control Center: 0-202-368-9872  Trans Lifeline: 4-521-310-2323 - Hotline for transgender people of all ages  The [employer]: 7-655-518-2025 - Hotline for LGBT youth     For Non-Emergency Support:   Fast Tracker: Mental Health & Substance Use Disorder Resources -   https://www.Apiaryn.org/

## 2024-12-12 NOTE — TELEPHONE ENCOUNTER
Medication unable to be refilled by RN due to criteria not met as indicated.                 []Eligibility - not seen in the last year              []Supervision - no future appointment              []Compliance - no shows, cancellations or lapse in therapy              [x]Verification - order discrepancy              []Controlled medication              []Medication not included in policy              []90-day supply request              []Other:

## 2025-01-23 ENCOUNTER — VIRTUAL VISIT (OUTPATIENT)
Dept: PEDIATRICS | Facility: CLINIC | Age: 25
End: 2025-01-23
Payer: COMMERCIAL

## 2025-01-23 VITALS — BODY MASS INDEX: 21.47 KG/M2 | HEIGHT: 70 IN | WEIGHT: 150 LBS

## 2025-01-23 DIAGNOSIS — F32.1 CURRENT MODERATE EPISODE OF MAJOR DEPRESSIVE DISORDER WITHOUT PRIOR EPISODE (H): ICD-10-CM

## 2025-01-23 DIAGNOSIS — F41.1 GENERALIZED ANXIETY DISORDER: ICD-10-CM

## 2025-01-23 DIAGNOSIS — F90.2 ATTENTION-DEFICIT HYPERACTIVITY DISORDER, COMBINED TYPE: Primary | ICD-10-CM

## 2025-01-23 PROBLEM — F41.9 ANXIETY: Status: RESOLVED | Noted: 2019-02-08 | Resolved: 2025-01-23

## 2025-01-23 ASSESSMENT — PATIENT HEALTH QUESTIONNAIRE - PHQ9
SUM OF ALL RESPONSES TO PHQ QUESTIONS 1-9: 7
SUM OF ALL RESPONSES TO PHQ QUESTIONS 1-9: 7
10. IF YOU CHECKED OFF ANY PROBLEMS, HOW DIFFICULT HAVE THESE PROBLEMS MADE IT FOR YOU TO DO YOUR WORK, TAKE CARE OF THINGS AT HOME, OR GET ALONG WITH OTHER PEOPLE: NOT DIFFICULT AT ALL

## 2025-01-23 NOTE — PROGRESS NOTES
"ASSESSMENT:  ADHD, combined type.   Generalized anxiety.  Major depressive disorder, single episode, moderate.     PLAN:   Diagnostic: No new diagnostic studies at this time.  Behavior management: Navigating early adult expectations of self and finding a bath to full independence.   Sleep: Monitor.  Physical Activity: Going to the gym regularly.  Nutrition: Varied diet.  Housing: Living in an apartment on campus for the first time in fall 2024.  Relationships: Explore groups, clubs, volunteering and activities. Reduce the barrier to connection with others through a shared interest and a consistent schedule.   Creative expression: Creativity, natural world, non-human animal connection, giving to others, and physical activity can be avenues to deeply enjoying episodes of solitude.  Medication: On a hiatus from medication.   Education: At [employer], pursuing a degree in marketing.  [facility]ID Collaboration: None.  Therapy: Weekly therapy. Good therapeutic connection with new therapist.  Rehabilitation: None.  Community supports: None.   Follow-up: Monthly.     Hawk is a 24year 5month old last seen on December 12.   Estimated body mass index is 21.93 kg/m  as calculated from the following:    Height as of 5/16/24: 5' 10.5\" (179.1 cm).    Weight as of 5/16/24: 155 lb (70.3 kg).   No height and weight on file for this encounter.     This was a telemedicine visit with the physician located at home.     The EMR was reviewed on the day of the visit to review my most recent note and the intervening events since the last visit. Pertinent information from that review includes the following: No additional visits since last seen.    At today's visit, I visited with Hawk.     \"It's been a roller coaster.     \"I've been in a weird spot. I have a lot of goals I'm trying to achieve. But the opportunities are elusive. They're not appearing the way I want them too.     \"I'm wanting to start a business with a couple of buddies. But I need some " "startup capital.     \"This job market is kicking my a**. I've revised my resume a bunch of times. It's just been hell.     \"My dream's just been pushed off over and over again. It's feeding into my negative thought loops.     \"My mom is falling apart at the seams. When I told her I was heading back to campus, she just broke down. I felt really bad for leaving her.     \"One of the worst areas in life is to be stuck and full of ambition.     \"I thought psilocybin with help with a shift in perspective. I looked into all the research studies. I wonder if I just need a change in my mindset.     \"I've had my fill of prescription medication. I just can't go back there.     Topics for today: maternal self care, mood, worries, function, medications  Tasks for today: renew meds,     Next visit is 3/13, 4/17     55 minutes spent on the date of the encounter doing chart review, history and exam, documentation and further activities per the note. The longitudinal plan of care for the diagnosis(es)/condition(s) as documented were addressed during this visit. Due to the added complexity in care, I will continue to support Hawk in the subsequent management and with ongoing continuity of care.      Answers submitted by the patient for this visit:  Patient Health Questionnaire (Submitted on 1/23/2025)  If you checked off any problems, how difficult have these problems made it for you to do your work, take care of things at home, or get along with other people?: Not difficult at all  PHQ9 TOTAL SCORE: 7    "

## 2025-01-23 NOTE — LETTER
"  1/23/2025      RE: Hawk Wiggins  [address]  [facility] [address]     Dear Colleague,    Thank you for referring your patient, Hawk Wigigns, to the [facility]. Please see a copy of my visit note below.    ASSESSMENT:  ADHD, combined type.   Generalized anxiety.  Major depressive disorder, single episode, moderate.     PLAN:   Diagnostic: No new diagnostic studies at this time.  Behavior management: Navigating early adult expectations of self and finding a bath to full independence.   Sleep: Monitor.  Physical Activity: Going to the gym regularly.  Nutrition: Varied diet.  Housing: Living in an apartment on campus for the first time in fall 2024.  Relationships: Explore groups, clubs, volunteering and activities. Reduce the barrier to connection with others through a shared interest and a consistent schedule.   Creative expression: Creativity, natural world, non-human animal connection, giving to others, and physical activity can be avenues to deeply enjoying episodes of solitude.  Medication: On a hiatus from medication.   Education: At [employer], pursuing a degree in marketing.  [facility] Collaboration: None.  Therapy: Weekly therapy. Good therapeutic connection with new therapist.  Rehabilitation: None.  Community supports: None.   Follow-up: Monthly.     Hawk is a 24year 5month old last seen on December 12.   Estimated body mass index is 21.93 kg/m  as calculated from the following:    Height as of 5/16/24: 5' 10.5\" (179.1 cm).    Weight as of 5/16/24: 155 lb (70.3 kg).   No height and weight on file for this encounter.     This was a telemedicine visit with the physician located at home.     The EMR was reviewed on the day of the visit to review my most recent note and the intervening events since the last visit. Pertinent information from that review includes the following: No additional visits since last seen.    At today's visit, I visited with Hawk.     \"It's been a " "roller coaster.     \"I've been in a weird spot. I have a lot of goals I'm trying to achieve. But the opportunities are elusive. They're not appearing the way I want them too.     \"I'm wanting to start a business with a couple of buddies. But I need some startup capital.     \"This job market is kicking my a**. I've revised my resume a bunch of times. It's just been hell.     \"My dream's just been pushed off over and over again. It's feeding into my negative thought loops.     \"My mom is falling apart at the seams. When I told her I was heading back to campus, she just broke down. I felt really bad for leaving her.     \"One of the worst areas in life is to be stuck and full of ambition.     \"I thought psilocybin with help with a shift in perspective. I looked into all the research studies. I wonder if I just need a change in my mindset.     \"I've had my fill of prescription medication. I just can't go back there.     Topics for today: maternal self care, mood, worries, function, medications  Tasks for today: renew meds,     Next visit is 3/13, 4/17     55 minutes spent on the date of the encounter doing chart review, history and exam, documentation and further activities per the note. The longitudinal plan of care for the diagnosis(es)/condition(s) as documented were addressed during this visit. Due to the added complexity in care, I will continue to support Hawk in the subsequent management and with ongoing continuity of care.      Answers submitted by the patient for this visit:  Patient Health Questionnaire (Submitted on 1/23/2025)  If you checked off any problems, how difficult have these problems made it for you to do your work, take care of things at home, or get along with other people?: Not difficult at all  PHQ9 TOTAL SCORE: 7      Again, thank you for allowing me to participate in the care of your patient.      Sincerely,    Crystal Chirinos MD  "

## 2025-01-23 NOTE — NURSING NOTE
Current patient location: [address] [place] [id number]    Is the patient currently in the state of [place]? YES    Visit mode: VIDEO    If the visit is dropped, the patient can be reconnected by:VIDEO VISIT: Text to cell phone:   Telephone Information:   Mobile [phone]       Will anyone else be joining the visit? NO  (If patient encounters technical issues they should call [phone]    Are changes needed to the allergy or medication list? No    Are refills needed on medications prescribed by this physician? NO    Rooming Documentation:  Questionnaire(s) completed    Reason for visit: RECHECK    Charla GALVAN    
independent

## 2025-03-13 ENCOUNTER — VIRTUAL VISIT (OUTPATIENT)
Dept: PEDIATRICS | Facility: CLINIC | Age: 25
End: 2025-03-13
Payer: COMMERCIAL

## 2025-03-13 VITALS — BODY MASS INDEX: 22.33 KG/M2 | WEIGHT: 156 LBS | HEIGHT: 70 IN

## 2025-03-13 DIAGNOSIS — F41.1 GENERALIZED ANXIETY DISORDER: ICD-10-CM

## 2025-03-13 DIAGNOSIS — F32.1 CURRENT MODERATE EPISODE OF MAJOR DEPRESSIVE DISORDER WITHOUT PRIOR EPISODE (H): ICD-10-CM

## 2025-03-13 DIAGNOSIS — F90.2 ATTENTION-DEFICIT HYPERACTIVITY DISORDER, COMBINED TYPE: Primary | ICD-10-CM

## 2025-03-13 ASSESSMENT — PATIENT HEALTH QUESTIONNAIRE - PHQ9
10. IF YOU CHECKED OFF ANY PROBLEMS, HOW DIFFICULT HAVE THESE PROBLEMS MADE IT FOR YOU TO DO YOUR WORK, TAKE CARE OF THINGS AT HOME, OR GET ALONG WITH OTHER PEOPLE: NOT DIFFICULT AT ALL
SUM OF ALL RESPONSES TO PHQ QUESTIONS 1-9: 5
SUM OF ALL RESPONSES TO PHQ QUESTIONS 1-9: 5

## 2025-03-13 NOTE — NURSING NOTE
Current patient location: [address] [place] [address]    Is the patient currently in the state of [place]? YES    Visit mode: VIDEO    If the visit is dropped, the patient can be reconnected by:VIDEO VISIT: Text to cell phone:   Telephone Information:   Mobile [phone]       Will anyone else be joining the visit? NO  (If patient encounters technical issues they should call [phone]    Are changes needed to the allergy or medication list? No    Are refills needed on medications prescribed by this physician? NO    Rooming Documentation:  Questionnaire(s) completed    Reason for visit: RECHECK    Charla GALVAN

## 2025-03-13 NOTE — LETTER
"  3/13/2025      RE: Hawk Wiggins  [address]  [place] [address]     Dear Colleague,    Thank you for referring your patient, Hawk Wiggins, to the [facility]. Please see a copy of my visit note below.    Virtual Visit Details    Type of service:  Video Visit     Originating Location (pt. Location): Home  Distant Location (provider location):  Off-site  Platform used for Video Visit: [employer]      ASSESSMENT:  ADHD, combined type.   Generalized anxiety.  Major depressive disorder, single episode, moderate.     PLAN:   Diagnostic: No new diagnostic studies at this time.  Behavior management: Navigating early adult expectations of self, inherent self-worth, and drive for continued development and thriving.   Sleep: Monitor.  Physical Activity: Going to the gym regularly.  Nutrition: Varied diet.  Housing: Living in an apartment on campus for the first time in fall 2024.  Relationships: Explore groups, clubs, volunteering and activities. Reduce the barrier to connection with others through a shared interest and a consistent schedule.   Creative expression: Creativity, natural world, non-human animal connection, giving to others, and physical activity can be avenues to deeply enjoying episodes of solitude.  Medication: On a hiatus from medication.   Education: At [employer], pursuing a degree in marketing.  [facility] Collaboration: None.  Therapy: Weekly therapy. Good therapeutic connection with new therapist.  Rehabilitation: None.  Community supports: None.   Follow-up: Monthly.     Hawk is a 24year 7month old last seen on December 12.   Estimated body mass index is 21.83 kg/m  as calculated from the following:    Height as of 1/23/25: 5' 9.5\" (176.5 cm).    Weight as of 1/23/25: 150 lb (68 kg).   No height and weight on file for this encounter.     The EMR was reviewed on the day of the visit to review my most recent note and the intervening events since the last visit. Pertinent " "information from that review includes the following: behavioral health visit in January.    \"A lot is going on. 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     \"I still wake up a lot of days with that feeling that I need to be somewhere else.     \"I talk to my cousin about this. He's like an older brother to me. I just tell him I'm so unsatisfied.     \"I don't want to work a corporate job. I want to work for myself. I want to be an . I want to be financially independent.     \"He's just worried I'll burn myself out to early in the process. Because I'll go to hard and I won't give myself credit for anything in my life. I just feel like I could always be working harder.     \"My mom says that too. She says I'm too focused on what I want and not focused on where I'm at. People have always said what I should be doing.     Tasks for today: check med refill    Next visit is April 17     37 minutes spent on the date of the encounter doing chart review, history and exam, documentation and further activities per the note. The longitudinal plan of care for the diagnosis(es)/condition(s) as documented were addressed during this visit. Due to the added complexity in care, I will continue to support Hawk in the subsequent management and with ongoing continuity of care.      Answers submitted by the patient for this visit:  Patient Health Questionnaire (Submitted on 3/13/2025)  If you checked off any problems, how difficult have these problems made it for you to do your work, take care of things at home, or get along with other people?: Not difficult at all  PHQ9 TOTAL SCORE: 5      Again, thank you for allowing me to participate in the care of your patient.      Sincerely,    Crystal Chirinos MD  "

## 2025-03-13 NOTE — PROGRESS NOTES
"Virtual Visit Details    Type of service:  Video Visit     Originating Location (pt. Location): Home  Distant Location (provider location):  Off-site  Platform used for Video Visit: [employer]      ASSESSMENT:  ADHD, combined type.   Generalized anxiety.  Major depressive disorder, single episode, moderate.     PLAN:   Diagnostic: No new diagnostic studies at this time.  Behavior management: Navigating early adult expectations of self, inherent self-worth, and drive for continued development and thriving.   Sleep: Monitor.  Physical Activity: Going to the gym regularly.  Nutrition: Varied diet.  Housing: Living in an apartment on campus for the first time in fall 2024.  Relationships: Explore groups, clubs, volunteering and activities. Reduce the barrier to connection with others through a shared interest and a consistent schedule.   Creative expression: Creativity, natural world, non-human animal connection, giving to others, and physical activity can be avenues to deeply enjoying episodes of solitude.  Medication: On a hiatus from medication.   Education: At [employer], pursuing a degree in marketing.  [facility] Collaboration: None.  Therapy: Weekly therapy. Good therapeutic connection with new therapist.  Rehabilitation: None.  Community supports: None.   Follow-up: Monthly.     Hawk is a 24year 7month old last seen on December 12.   Estimated body mass index is 21.83 kg/m  as calculated from the following:    Height as of 1/23/25: 5' 9.5\" (176.5 cm).    Weight as of 1/23/25: 150 lb (68 kg).   No height and weight on file for this encounter.     The EMR was reviewed on the day of the visit to review my most recent note and the intervening events since the last visit. Pertinent information from that review includes the following: behavioral health visit in January.    \"A lot is going on. 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     \"I still wake up a lot of days with that feeling that I need to be somewhere else.     \"I talk " "to my cousin about this. He's like an older brother to me. I just tell him I'm so unsatisfied.     \"I don't want to work a corporate job. I want to work for myself. I want to be an . I want to be financially independent.     \"He's just worried I'll burn myself out to early in the process. Because I'll go to hard and I won't give myself credit for anything in my life. I just feel like I could always be working harder.     \"My mom says that too. She says I'm too focused on what I want and not focused on where I'm at. People have always said what I should be doing.     Tasks for today: check med refill    Next visit is April 17     37 minutes spent on the date of the encounter doing chart review, history and exam, documentation and further activities per the note. The longitudinal plan of care for the diagnosis(es)/condition(s) as documented were addressed during this visit. Due to the added complexity in care, I will continue to support Hawk in the subsequent management and with ongoing continuity of care.      Answers submitted by the patient for this visit:  Patient Health Questionnaire (Submitted on 3/13/2025)  If you checked off any problems, how difficult have these problems made it for you to do your work, take care of things at home, or get along with other people?: Not difficult at all  PHQ9 TOTAL SCORE: 5    "

## 2025-03-31 ENCOUNTER — NURSE TRIAGE (OUTPATIENT)
Dept: INTERNAL MEDICINE | Facility: CLINIC | Age: 25
End: 2025-03-31

## 2025-03-31 ENCOUNTER — VIRTUAL VISIT (OUTPATIENT)
Dept: INTERNAL MEDICINE | Facility: CLINIC | Age: 25
End: 2025-03-31
Payer: COMMERCIAL

## 2025-03-31 DIAGNOSIS — G47.00 INSOMNIA, UNSPECIFIED TYPE: Primary | ICD-10-CM

## 2025-03-31 PROCEDURE — 98006 SYNCH AUDIO-VIDEO EST MOD 30: CPT | Performed by: INTERNAL MEDICINE

## 2025-03-31 RX ORDER — TRAZODONE HYDROCHLORIDE 50 MG/1
50 TABLET ORAL AT BEDTIME
Qty: 20 TABLET | Refills: 0 | Status: SHIPPED | OUTPATIENT
Start: 2025-03-31

## 2025-03-31 NOTE — TELEPHONE ENCOUNTER
"Virtual visit scheduled today per patient's preference.  Gina Sheldon RN    Reason for Disposition   MODERATE - SEVERE insomnia (e.g., interferes with work or school)    Additional Information   Negative: Difficulty breathing   Negative: Depression is main problem or symptom (e.g., feelings of sadness or hopelessness)   Negative: Traumatic Brain Injury (TBI) is suspected   Negative: Suspected alcohol withdrawal or unhealthy use of alcohol (drinking too much)   Negative: Suspected substance use (drug use), addiction, or withdrawal   Negative: Pain is causing insomnia and pain is NOT a chronic symptom (recurrent or ongoing AND present > 4 weeks)    Answer Assessment - Initial Assessment Questions  1. DESCRIPTION: \"Tell me about your sleeping problem.\"       No sleep x 4 days. Racing brain  2. ONSET: \"How long have you been having trouble sleeping?\" (e.g., days, weeks, months)      4 days  3. RECURRENT: \"Have you had sleeping problems before?\"  If Yes, ask: \"What happened that time?\" \"What helped your sleeping problem go away in the past?\"       Never before  4. STRESS: \"Is there anything in your life that is making you feel stressed or tense?\"  Stressed because he isn't able to meet his full potential as a person  5. PAIN: \"Do you have any pain that is keeping you awake?\" (e.g., back pain, headache, abdomen pain)      none  6. CAFFEINE ABUSE: \"Do you drink caffeinated beverages, and how much each day?\" (e.g., coffee, tea, macey)      Energy drink in the AM and afternoon  7. ALCOHOL USE OR SUBSTANCE USE (DRUG USE): \"Do you drink alcohol or use any illegal drugs?\"      Rare alcohol  8. OTHER SYMPTOMS: \"Do you have any other symptoms?\"  (e.g., difficulty breathing)      none    Protocols used: Insomnia-A-[place]    "

## 2025-03-31 NOTE — PROGRESS NOTES
"Hawk is a 24 year old who is being evaluated via a billable video visit.    How would you like to obtain your AVS? MyChart  If the video visit is dropped, the invitation should be resent by: Text to cell phone: [phone]  Will anyone else be joining your video visit? No      Assessment & Plan     Insomnia, unspecified type  I have never met this patient, I typically do not prescribe controlled substances to the patient's who I have not met face-to-face.  Suspect that underlying and currently incompletely treated generalized anxiety is most likely to blame here, and suspect that long-term he would benefit from SSRI therapy, but we will leave that to his psychiatrist and/or primary care provider.  Offered a small supply of trazodone to try in the coming days, until he can have further follow-up with his psychiatric provider.  - traZODone (DESYREL) 50 MG tablet; Take 1 tablet (50 mg) by mouth at bedtime.                Subjective   Hawk is a 24 year old, presenting for the following health issues:  Insomnia      History of Present Illness       Reason for visit:  Insomnia  Symptom onset:  3-7 days ago  Symptoms include:  Racing thoughts at night, can't sleep because of racing thoughts  Symptom intensity:  Moderate  Symptom progression:  Staying the same  Had these symptoms before:  No   He is taking medications regularly.            Patient is currently under the care of a psychiatrist, as well as a different primary care physician.  He has been having profound difficulty sleeping in the last 4 nights, in the context of underlying generalized anxiety disorder, for which she is not on any specific therapy at present.  He also carries a diagnosis of attention deficit disorder and has been prescribed Ritalin as recently as November 2024.    Has had very little sleep each of the last 3-4 nights.  Cannot fall asleep, cannot slow his mind down, has racing thoughts that are \"fighting with each other.\"  Has tried 50 mg of " "hydroxyzine, large doses of melatonin, no help.    Is not currently on any SSRI therapy, has been in the past.          Review of Systems  Constitutional, HEENT, cardiovascular, pulmonary, gi and gu systems are negative, except as otherwise noted.      Objective    Vitals - Patient Reported  Weight (Patient Reported): 71.7 kg (158 lb)  Height (Patient Reported): 177.8 cm (5' 10\")  BMI (Based on Pt Reported Ht/Wt): 22.67        Physical Exam   GENERAL: alert and no distress  EYES: Eyes grossly normal to inspection.  No discharge or erythema, or obvious scleral/conjunctival abnormalities.  RESP: No audible wheeze, cough, or visible cyanosis.    SKIN: Visible skin clear. No significant rash, abnormal pigmentation or lesions.  NEURO: Cranial nerves grossly intact.  Mentation and speech appropriate for age.  PSYCH: Appropriate affect, tone, and pace of words          Video-Visit Details    Type of service:  Video Visit   Originating Location (pt. Location): Home    Distant Location (provider location):  On-site  Platform used for Video Visit: [employer]  Signed Electronically by: Jaun Fine MD    "

## 2025-04-04 ENCOUNTER — TELEPHONE (OUTPATIENT)
Dept: PEDIATRICS | Facility: CLINIC | Age: 25
End: 2025-04-04
Payer: COMMERCIAL

## 2025-04-04 NOTE — TELEPHONE ENCOUNTER
Patient called and asked if Dr. Chirinos will be willing to request the medication Paxil to help him with insomnia and anxiety. Patient states he was recommended this medication on his recent visit to the Urgent Care. Patient also states he has seen 6 different doctors for his severe case of insomnia and have tried multiple prescriptions and nothing seems to be helping him.

## 2025-04-05 ENCOUNTER — HOSPITAL ENCOUNTER (EMERGENCY)
Facility: CLINIC | Age: 25
Discharge: HOME | End: 2025-04-05
Attending: EMERGENCY MEDICINE | Admitting: EMERGENCY MEDICINE
Payer: COMMERCIAL

## 2025-04-05 VITALS
RESPIRATION RATE: 18 BRPM | DIASTOLIC BLOOD PRESSURE: 68 MMHG | TEMPERATURE: 97.5 F | HEART RATE: 60 BPM | SYSTOLIC BLOOD PRESSURE: 100 MMHG | OXYGEN SATURATION: 98 %

## 2025-04-05 DIAGNOSIS — G47.09 OTHER INSOMNIA: ICD-10-CM

## 2025-04-05 LAB
ANION GAP SERPL CALCULATED.3IONS-SCNC: 8 MMOL/L (ref 7–15)
BASOPHILS # BLD AUTO: 0 10E3/UL (ref 0–0.2)
BASOPHILS NFR BLD AUTO: 1 %
BUN SERPL-MCNC: 20.1 MG/DL (ref 6–20)
CALCIUM SERPL-MCNC: 9 MG/DL (ref 8.8–10.4)
CHLORIDE SERPL-SCNC: 105 MMOL/L (ref 98–107)
CREAT SERPL-MCNC: 1.1 MG/DL (ref 0.67–1.17)
EGFRCR SERPLBLD CKD-EPI 2021: >90 ML/MIN/1.73M2
EOSINOPHIL # BLD AUTO: 0.2 10E3/UL (ref 0–0.7)
EOSINOPHIL NFR BLD AUTO: 3 %
ERYTHROCYTE [DISTWIDTH] IN BLOOD BY AUTOMATED COUNT: 12 % (ref 10–15)
GLUCOSE SERPL-MCNC: 88 MG/DL (ref 70–99)
HCO3 SERPL-SCNC: 28 MMOL/L (ref 22–29)
HCT VFR BLD AUTO: 46.6 % (ref 40–53)
HGB BLD-MCNC: 16.4 G/DL (ref 13.3–17.7)
IMM GRANULOCYTES # BLD: 0 10E3/UL
IMM GRANULOCYTES NFR BLD: 0 %
LYMPHOCYTES # BLD AUTO: 2.3 10E3/UL (ref 0.8–5.3)
LYMPHOCYTES NFR BLD AUTO: 43 %
MCH RBC QN AUTO: 29.8 PG (ref 26.5–33)
MCHC RBC AUTO-ENTMCNC: 35.2 G/DL (ref 31.5–36.5)
MCV RBC AUTO: 85 FL (ref 78–100)
MONOCYTES # BLD AUTO: 0.6 10E3/UL (ref 0–1.3)
MONOCYTES NFR BLD AUTO: 11 %
NEUTROPHILS # BLD AUTO: 2.3 10E3/UL (ref 1.6–8.3)
NEUTROPHILS NFR BLD AUTO: 42 %
NRBC # BLD AUTO: 0 10E3/UL
NRBC BLD AUTO-RTO: 0 /100
PLATELET # BLD AUTO: 221 10E3/UL (ref 150–450)
POTASSIUM SERPL-SCNC: 4.8 MMOL/L (ref 3.4–5.3)
RBC # BLD AUTO: 5.51 10E6/UL (ref 4.4–5.9)
SODIUM SERPL-SCNC: 141 MMOL/L (ref 135–145)
TSH SERPL DL<=0.005 MIU/L-ACNC: 1.55 UIU/ML (ref 0.3–4.2)
WBC # BLD AUTO: 5.4 10E3/UL (ref 4–11)

## 2025-04-05 PROCEDURE — 99283 EMERGENCY DEPT VISIT LOW MDM: CPT

## 2025-04-05 PROCEDURE — 85025 COMPLETE CBC W/AUTO DIFF WBC: CPT | Performed by: EMERGENCY MEDICINE

## 2025-04-05 PROCEDURE — 80048 BASIC METABOLIC PNL TOTAL CA: CPT | Performed by: EMERGENCY MEDICINE

## 2025-04-05 PROCEDURE — 36415 COLL VENOUS BLD VENIPUNCTURE: CPT | Performed by: EMERGENCY MEDICINE

## 2025-04-05 PROCEDURE — 84443 ASSAY THYROID STIM HORMONE: CPT | Performed by: EMERGENCY MEDICINE

## 2025-04-05 RX ORDER — OLANZAPINE 5 MG/1
5 TABLET, ORALLY DISINTEGRATING ORAL AT BEDTIME
Qty: 3 TABLET | Refills: 0 | Status: SHIPPED | OUTPATIENT
Start: 2025-04-05 | End: 2025-04-05

## 2025-04-05 RX ORDER — OLANZAPINE 5 MG/1
5 TABLET, ORALLY DISINTEGRATING ORAL AT BEDTIME
Qty: 3 TABLET | Refills: 0 | Status: SHIPPED | OUTPATIENT
Start: 2025-04-05

## 2025-04-05 ASSESSMENT — ACTIVITIES OF DAILY LIVING (ADL)
ADLS_ACUITY_SCORE: 41

## 2025-04-05 ASSESSMENT — COLUMBIA-SUICIDE SEVERITY RATING SCALE - C-SSRS
2. HAVE YOU ACTUALLY HAD ANY THOUGHTS OF KILLING YOURSELF IN THE PAST MONTH?: NO
6. HAVE YOU EVER DONE ANYTHING, STARTED TO DO ANYTHING, OR PREPARED TO DO ANYTHING TO END YOUR LIFE?: NO
1. IN THE PAST MONTH, HAVE YOU WISHED YOU WERE DEAD OR WISHED YOU COULD GO TO SLEEP AND NOT WAKE UP?: NO

## 2025-04-05 NOTE — CONSULTS
Diagnostic Evaluation Consultation  Crisis Assessment    Patient Name: Hawk Wiggins  Age:  24 year old  Legal Sex: male  Gender Identity: male  Pronouns:   Race: White  Ethnicity: Choose not to answer  Language: English      Patient was assessed: Virtual: Solul[employer]   Crisis Assessment Start Date: 04/05/25  Crisis Assessment Start Time: 1517  Crisis Assessment Stop Time: 1539  Patient location: [facility] Emergency Dept                             ED11    Referral Data and Chief Complaint  Hawk Wiggins presents to the ED by  self. Patient is presenting to the ED for the following concerns: Other (see comment), Worsening psychosocial stress (insomnia). Factors that make the mental health crisis life threatening or complex are: Pt presents to emergency department by self for evaluation of insomnia. Patient states that he has not slept well for over a week. He has had numerous visits to urgent care for this issue. He reports this issue started one week ago and he has not been able to sleep more than 30minutes-4hours a night He reports that he is having racing thoughts at night, but they are random and not particular and do not involve any stressors or life events. Denies thoughts of hurting himself or others. Has had some intermittent insomnia over the years but it has never been this severe. Denies manic symptoms or psychosis and reports he is so tired he feels like his body is depleted..      Informed Consent and Assessment Methods  Explained the crisis assessment process, including applicable information disclosures and limits to confidentiality, assessed understanding of the process, and obtained consent to proceed with the assessment.  Assessment methods included conducting a formal interview with patient, review of medical records, collaboration with medical staff, and obtaining relevant collateral information from family and community providers when available.  : done     History of the Crisis   Pt has  historical dx of adjustment disorder, generalized anxiety disorder, and ADHD. He has no hx of IP  hospitalizations or prior ED visits for insomnia, however he has had numerous visits to urgent care for this issue. He last met with his psychiatrist 2 weeks ago but was not having the insomnia issue. He has tried multiple medications including trazodone initially 25 mg, then up to 50 mg, and then doxepin 10 mg and up to 25 mg of doxepin last night, but still has been unable to sleep well. Says he goes to individual therapy once a week, but chart review indicates last appt was in February 2025.    Brief Psychosocial History  Family:  Single, Children no  Support System:  Parent(s), Friend  Employment Status:  student, employed part-time  Source of Income:  salary/wages  Financial Environmental Concerns:  none  Current Hobbies:  exercise/fitness, social media/computer activities, group/social activities  Barriers in Personal Life:  mental health concerns    Significant Clinical History  Current Anxiety Symptoms:  racing thoughts, anxious (trouble sleeping)  Current Depression/Trauma:  difficulty concentrating, irritable, helplessness  Current Somatic Symptoms:     Current Psychosis/Thought Disturbance:     Current Eating Symptoms:     Chemical Use History:  Alcohol: Social   Past diagnosis:  ADHD, Anxiety Disorder  Family history:  No known history of mental health or chemical health concerns  Past treatment:  Psychiatric Medication Management  Details of most recent treatment:     Other relevant history:  Pt goes to school and works part time. No legal issues or chronic medical conditions    Have there been any medication changes in the past two weeks:  yes, please comment       Is the patient compliant with medications:  yes        Collateral Information  Is there collateral information: No     Collateral information name, relationship, phone number:       What happened today:       What is different about patient's  functioning:       What do you think the patient needs:      Has patient made comments about wanting to kill themselves/others:      If d/c is recommended, can they take part in safety/aftercare planning:       Additional collateral information:        Risk Assessment  W[place] Suicide Severity Rating Scale Full Clinical Version:  Suicidal Ideation  Q1 Wish to be Dead (Lifetime): No  Q2 Non-Specific Active Suicidal Thoughts (Lifetime): No  Q6 Suicide Behavior (Lifetime): no     Suicidal Behavior (Lifetime)  Actual Attempt (Lifetime): No  Has subject engaged in non-suicidal self-injurious behavior? (Lifetime): No  Interrupted Attempts (Lifetime): No  Aborted or Self-Interrupted Attempt (Lifetime): No  Preparatory Acts or Behavior (Lifetime): No    Weldon Suicide Severity Rating Scale Recent:   Suicidal Ideation (Recent)  Q1 Wished to be Dead (Past Month): no  Q2 Suicidal Thoughts (Past Month): no  Level of Risk per Screen: no risks indicated     Suicidal Behavior (Recent)  Actual Attempt (Past 3 Months): No  Has subject engaged in non-suicidal self-injurious behavior? (Past 3 Months): No  Interrupted Attempts (Past 3 Months): No  Aborted or Self-Interrupted Attempt (Past 3 Months): No  Preparatory Acts or Behavior (Past 3 Months): No    Environmental or Psychosocial Events: other life stressors  Protective Factors: Protective Factors: strong bond to family unit, community support, or employment, optimistic outlook - identification of future goals, help seeking, responsibilities and duties to others, including pets and children, lives in a responsibly safe and stable environment, sense of belonging, sense of importance of health and wellness, able to access care without barriers    Does the patient have thoughts of harming others? Feels Like Hurting Others: no  Previous Attempt to Hurt Others: no  Is the patient engaging in sexually inappropriate behavior?: no  Does Patient have a known history of aggressive  behavior: No    Is the patient engaging in sexually inappropriate behavior?  no        Mental Status Exam   Affect: Appropriate  Appearance: Appropriate  Attention Span/Concentration: Attentive  Eye Contact: Engaged    Fund of Knowledge: Appropriate   Language /Speech Content: Fluent  Language /Speech Volume: Normal  Language /Speech Rate/Productions: Normal  Recent Memory: Intact  Remote Memory: Intact  Mood: Normal  Orientation to Person: Yes   Orientation to Place: Yes  Orientation to Time of Day: Yes  Orientation to Date: Yes     Situation (Do they understand why they are here?): Yes  Psychomotor Behavior: Normal  Thought Content: Clear  Thought Form: Intact     Mini-Cog Assessment  Number of Words Recalled:    Clock-Drawing Test:     Three Item Recall:    Mini-Cog Total Score:       Medication  Psychotropic medications:   Medication Orders - Psychiatric (From admission, onward)      None             Current Care Team  Patient Care Team:  Sierra Cespedes MD as PCP - General (Family Medicine)  Crystal Chirinos MD as MD (Pediatrics)    Diagnosis  Patient Active Problem List   Diagnosis Code    Attention-deficit hyperactivity disorder, combined type F90.2    Current moderate episode of major depressive disorder without prior episode (H) F32.1    Generalized anxiety disorder F41.1       Primary Problem This Admission  Active Hospital Problems    *Generalized anxiety disorder        Clinical Summary and Substantiation of Recommendations   Clinical Substantiation:  After therapeutic assessment, intervention and aftercare planning by ED care team and LM and in consultation with attending provider, the patient's circumstances and mental state were appropriate for outpatient management. At this time the pt is not presenting as an acute risk to self and idntifies primary concern of insomnia. Pt will speak with ED provider about potential prn while he waits to get into his psychiatrist. Pt declined offer for CBT  referral, but was agreeable to assesor putting in resources in the safety plan.    Goals for crisis stabilization:       Next steps for Care Team:       Treatment Objectives Addressed:  rapport building, identifying treatment goals, identifying and practicing coping strategies, assessing safety    Therapeutic Interventions:  Reviewed healthy living that supports positive mental health, including looking at sleep hygiene, regular movement, nutrition, and regular socialization.    Has a specific means been identified for suicidal/homicide actions: No      Patient coping skills attempted to reduce the crisis:  help seeking    Disposition  Recommended referrals: Medication Management        Reviewed case and recommendations with attending provider. Attending Name:         Attending concurs with disposition: yes       Patient and/or validated legal guardian concurs with disposition:   yes       Final disposition:  discharge                                                                                            Assessment Details   Total duration spent with the patient: 22 min     CPT code(s) utilized: 83052 - Psychotherapy (with patient) - 30 (16-37*) min    PATRICA Danielle, Psychotherapist  [facility] - Triage & Transition Services  Callback: [phone]    PATRICA Danielle on 4/5/2025 at 4:33 PM

## 2025-04-05 NOTE — ED PROVIDER NOTES
History     Chief Complaint:  Insomnia       HPI   Hawk Wiggins is a 24 year old male who presents emergency department for evaluation of insomnia.  Patient states that he has not slept well for over a week.  He has had numerous visits to urgent care for this issue.  He last met with his psychiatrist 2 weeks ago but was not having the insomnia issue.  His last caffeine use was over a week ago.  He has been trying to exercise in the morning, do relaxing baths, has tried multiple medications including trazodone initially 25 mg, then up to 50 mg, and then doxepin 10 mg and up to 25 mg of doxepin last night.  He reports that he is having racing thoughts at night.  Denies thoughts of hurting himself or others.  Has had some intermittent insomnia over the years but it has never been this severe.  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  He lives with roommates but has been staying with family this week    Independent Historian:    None    Review of External Notes:  Reviewed urgent care note from 4/30/2025.  Patient was seen for insomnia.  He was placed on trazodone 50 mg and then increased to 100 mg, and then switched to doxepin at 10 mg,    Medications:    traZODone (DESYREL) 50 MG tablet        Past Medical History:    Past Medical History:   Diagnosis Date    Adjustment disorder with mixed disturbance of emotions and conduct 1/3/2020    Chronic constipation 5/15/2012    Counseling on behalf of another 3/18/2020    Encopresis 5/15/2012    Nocturnal enuresis 5/15/2012       Past Surgical History:    No past surgical history on file.       Physical Exam   Patient Vitals for the past 24 hrs:   BP Temp Temp src Pulse Resp SpO2   04/05/25 1208 127/84 97.5  F (36.4  C) Temporal 80 18 99 %        Physical Exam  GENERAL: Awake, alert  CARDIOVASCULAR: Normal peripheral perfusion  LUNGS: Breathing comfortably on room air  ABDOMEN:  Nondistended   EXTREMITIES: No peripheral edema  NEURO: Awake and alert, moves all extremities   Psych: Patient is answering questions appropriately, cooperative, denies suicidal or homicidal ideation      Emergency Department Course     Imaging:  No orders to display       Laboratory:  Labs Ordered and Resulted from Time of ED Arrival to Time of ED Departure   BASIC METABOLIC PANEL - Abnormal       Result Value    Sodium 141      Potassium 4.8      Chloride 105      Carbon Dioxide (CO2) 28      Anion Gap 8      Urea Nitrogen 20.1 (*)     Creatinine 1.10      GFR Estimate >90      Calcium 9.0      Glucose 88     TSH WITH FREE T4 REFLEX - Normal    TSH 1.55     CBC WITH PLATELETS AND DIFFERENTIAL    WBC Count 5.4      RBC Count 5.51      Hemoglobin 16.4      Hematocrit 46.6      MCV 85      MCH 29.8      MCHC 35.2      RDW 12.0      Platelet Count 221      % Neutrophils 42      % Lymphocytes 43      % Monocytes 11      % Eosinophils 3      % Basophils 1      % Immature Granulocytes 0      NRBCs per 100 WBC 0      Absolute Neutrophils 2.3      Absolute Lymphocytes 2.3      Absolute Monocytes 0.6      Absolute Eosinophils 0.2      Absolute Basophils 0.0      Absolute Immature Granulocytes 0.0      Absolute NRBCs 0.0            Emergency Department Course & Assessments:    Interventions:  Medications - No data to display       Impression & Plan         Medical Decision Makin-year-old male who presents for evaluation of insomnia.  He had normal vital signs here.  Screening labs were obtained which shows normal CBC BMP and TSH.  He will be evaluated by DEC.  He is not suicidal or holdable at this point.  I recommended techniques such as cognitive shuffling, and CBT-I and disposition is pending DEC assessment but I said suspect that patient will be discharged.  Diagnosis:    ICD-10-CM    1. Other insomnia  G47.09            Discharge Medications:  New Prescriptions    No medications on file               Kelechi  Angelica ERWIN MD  04/05/25 4089

## 2025-04-05 NOTE — DISCHARGE INSTRUCTIONS
You were seen in the emergency department for insomnia.  You were seen by the crisis  assessors here and they have recommended cognitive behavioral therapy for you, please talk to your therapist and psychiatrist about this.  I am giving you a very short prescription for a medication called zyprexa for a trial.     Please call your therapist/psychiatrist Monday morning. Come back to the ER if you have thoughts of hurting yourself or others, think you are seeing or hearing things that others are not, or other concerning symptoms.

## 2025-04-05 NOTE — ED TRIAGE NOTES
Arrives ambulatory from the community. States has not slept for 7 days, states he sometimes falls asleep due to exhaustion.     States on not 3 attempted hydroxyzine, also attempted Trazone without improvement. States attempted benadryl and doxapen.

## 2025-04-05 NOTE — ED PROVIDER NOTES
I assumed care of patient from Dr Lorenz please see her H&P for further detail. Briefly 24yM presenting with insomnia pending DEC assessment.     3:42 PM  I spoke with DEC  Nadeen Penaloza. No safety concerns at this time.     I reassessed patient myself and spoke with him.  Confirmed that he has no thoughts of hurting himself or others.  We discussed that continued follow-up with outpatient providers would be very beneficial for him.  I am comfortable prescribing a short course of Zyprexa for him to try at nighttime.  Recommend continued avoidance of caffeine and ADHD stimulants.  Return precautions discussed, he verbalized understanding is comfortable to plan for discharge at this time.    MD You Noonan Connie, MD  04/08/25 7917

## 2025-04-05 NOTE — ED PROVIDER NOTES
I assumed care of this patient from my colleague Dr. Lorenz, please see her H&P for further history.  At this point in time we are pending DEC assessment for disposition.    4:38 PM   Patient has been seen by DEC , Nadeen Penaloza.  No safety concerns at this time.  She feels that it is safe for him to go home.  I assessed patient and spoke with him as well, certainly no SI HI, no indications for hold at this time.  I discussed that we can trial a very short course of Zyprexa for symptoms however he should continue outpatient workup with his providers. Return precautions discussed, patient is comfortable with the plan for discharge. Counseled to continue avoiding caffeine and stimulants including his Ritalin.     MD You Noonan Connie, MD  04/05/25 8810

## 2025-04-06 ENCOUNTER — TELEPHONE (OUTPATIENT)
Dept: BEHAVIORAL HEALTH | Facility: CLINIC | Age: 25
End: 2025-04-06
Payer: COMMERCIAL

## 2025-04-08 NOTE — TELEPHONE ENCOUNTER
Delon Kim,    Thanks for your message. I would not recommend starting Paxil without a more detailed conversation about the source of the insomnia. I am happy to see Hawk for a next available visit and we can discuss it at more length then. I'll need to learn more about the details of what he is experiencing in order to make an informed recommendation.   Best,  Kaila

## 2025-04-16 NOTE — PROGRESS NOTES
Virtual Visit Details    Type of service:  Video Visit   Originating Location (pt. Location): Home  Distant Location (provider location):  Off-site  Platform used for Video Visit: [employer]    ASSESSMENT:  ADHD, combined type.   Generalized anxiety.  Major depressive disorder, single episode, moderate.  Two week episode of sustained sleeplessness in April 2025.      PLAN:   Diagnostic: No new diagnostic studies at this time.  Behavior management: Navigating early adult expectations of self, inherent self-worth, and drive for continued development and thriving.   Sleep: Continue with adult sleep medicine.   Physical Activity: Return to the gym as possible.  Nutrition: Varied diet.  Housing: Temporarily moved back home because of episode of sleeplessness.   Relationships: Explore groups, clubs, volunteering and activities. Reduce the barrier to connection with others through a shared interest and a consistent schedule.   Creative expression: Creativity, natural world, non-human animal connection, giving to others, and physical activity can be avenues to deeply enjoying episodes of solitude.  Medication: On a hiatus from medication.   Education: At [employer], pursuing a bachelor's degree in marketing.  Therapy: Weekly therapy. Good therapeutic connection with new therapist.  Rehabilitation: None.  Community supports: None.   Follow-up: Monthly until able to transition to adult psychiatric care. Turning 25 in July. Referral made. Sees  for primary care.     Hawk is a 24year 8month old last seen on March 13.     The EMR was reviewed on the day of the visit to review my most recent note and the intervening events since the last visit. Pertinent information from that review includes the following: significant insomnia at the end of March, saw IM, FM, urgent care, suspected anxiety as most likely explanation, trial of trazodone up to 100 mg, trial of doxepin 10 mg, started on amitriptyline, moved back home    Today, Hawk and I  "visited at his mother's home.     \"This is the worst it's ever been. I've been to a bunch a doctors, I can't remember how many. I've been to a sleep medicine physician and I think things are back on track.     I haven't been back to the gym.     \"I've been on a bunch of different medicines.     \"I've been having a lot more difficulties with focus.     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     \"I'm keeping up with school.    \"I feel like I'm running out of time to reach the success I want for myself.    Next visit is 5/8, 6/12     47 minutes spent on the date of the encounter doing chart review, history and exam, documentation and further activities per the note. The longitudinal plan of care for the diagnosis(es)/condition(s) as documented were addressed during this visit. Due to the added complexity in care, I will continue to support Hawk in the subsequent management and with ongoing continuity of care.          "

## 2025-04-17 ENCOUNTER — VIRTUAL VISIT (OUTPATIENT)
Dept: PEDIATRICS | Facility: CLINIC | Age: 25
End: 2025-04-17
Payer: COMMERCIAL

## 2025-04-17 DIAGNOSIS — F90.2 ATTENTION-DEFICIT HYPERACTIVITY DISORDER, COMBINED TYPE: Primary | ICD-10-CM

## 2025-04-17 DIAGNOSIS — G47.00 PERSISTENT INSOMNIA: ICD-10-CM

## 2025-04-17 DIAGNOSIS — F41.1 GENERALIZED ANXIETY DISORDER: ICD-10-CM

## 2025-04-17 RX ORDER — AMITRIPTYLINE HYDROCHLORIDE 10 MG/1
20 TABLET ORAL
COMMUNITY
Start: 2025-04-14

## 2025-04-17 RX ORDER — DOXEPIN HYDROCHLORIDE 25 MG/1
CAPSULE ORAL
COMMUNITY
Start: 2025-04-04

## 2025-04-17 ASSESSMENT — PATIENT HEALTH QUESTIONNAIRE - PHQ9
SUM OF ALL RESPONSES TO PHQ QUESTIONS 1-9: 9
10. IF YOU CHECKED OFF ANY PROBLEMS, HOW DIFFICULT HAVE THESE PROBLEMS MADE IT FOR YOU TO DO YOUR WORK, TAKE CARE OF THINGS AT HOME, OR GET ALONG WITH OTHER PEOPLE: SOMEWHAT DIFFICULT
SUM OF ALL RESPONSES TO PHQ QUESTIONS 1-9: 9

## 2025-04-17 NOTE — LETTER
4/17/2025      RE: Hawk Wiggins  [id number]  [facility] [address]     Dear Colleague,    Thank you for referring your patient, Hawk Wiggins, to the [facility]. Please see a copy of my visit note below.    Virtual Visit Details    Type of service:  Video Visit   Originating Location (pt. Location): Home  Distant Location (provider location):  Off-site  Platform used for Video Visit: [employer]    ASSESSMENT:  ADHD, combined type.   Generalized anxiety.  Major depressive disorder, single episode, moderate.  Two week episode of sustained sleeplessness in April 2025.      PLAN:   Diagnostic: No new diagnostic studies at this time.  Behavior management: Navigating early adult expectations of self, inherent self-worth, and drive for continued development and thriving.   Sleep: Continue with adult sleep medicine.   Physical Activity: Return to the gym as possible.  Nutrition: Varied diet.  Housing: Temporarily moved back home because of episode of sleeplessness.   Relationships: Explore groups, clubs, volunteering and activities. Reduce the barrier to connection with others through a shared interest and a consistent schedule.   Creative expression: Creativity, natural world, non-human animal connection, giving to others, and physical activity can be avenues to deeply enjoying episodes of solitude.  Medication: On a hiatus from medication.   Education: At [employer], pursuing a bachelor's degree in marketing.  Therapy: Weekly therapy. Good therapeutic connection with new therapist.  Rehabilitation: None.  Community supports: None.   Follow-up: Monthly until able to transition to adult psychiatric care. Turning 25 in July. Referral made. Sees  for primary care.     Hawk is a 24year 8month old last seen on March 13.     The EMR was reviewed on the day of the visit to review my most recent note and the intervening events since the last visit. Pertinent information from that review  "includes the following: significant insomnia at the end of March, saw IM, FM, urgent care, suspected anxiety as most likely explanation, trial of trazodone up to 100 mg, trial of doxepin 10 mg, started on amitriptyline, moved back home    Today, Hawk and I visited at his mother's home.     \"This is the worst it's ever been. I've been to a bunch a doctors, I can't remember how many. I've been to a sleep medicine physician and I think things are back on track.     I haven't been back to the gym.     \"I've been on a bunch of different medicines.     \"I've been having a lot more difficulties with focus.     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     \"I'm keeping up with school.    \"I feel like I'm running out of time to reach the success I want for myself.    Next visit is 5/8, 6/12     47 minutes spent on the date of the encounter doing chart review, history and exam, documentation and further activities per the note. The longitudinal plan of care for the diagnosis(es)/condition(s) as documented were addressed during this visit. Due to the added complexity in care, I will continue to support Hawk in the subsequent management and with ongoing continuity of care.            Again, thank you for allowing me to participate in the care of your patient.      Sincerely,    Crytsal Chirinos MD  "

## 2025-04-17 NOTE — NURSING NOTE
Current patient location: [address] [place] [id number]    Is the patient currently in the state of [place]? YES    Visit mode:VIDEO    If the visit is dropped, the patient can be reconnected by: VIDEO VISIT: Send to e-mail at: [email or web address] by [email or web address]    Will anyone else be joining the visit? NO  (If patient encounters technical issues they should call [phone])    How would you like to obtain your AVS? MyChart    Are changes needed to the allergy or medication list? Pt stated no changes to allergies and Pt stated no med changes    Are refills needed on medications prescribed by this physician? NO    Rooming Documentation:  Questionnaire(s) completed    Reason for visit: RECHECK     Tyra GALVAN

## 2025-05-05 ENCOUNTER — NURSE TRIAGE (OUTPATIENT)
Dept: INTERNAL MEDICINE | Facility: CLINIC | Age: 25
End: 2025-05-05
Payer: COMMERCIAL

## 2025-05-05 ENCOUNTER — ANCILLARY PROCEDURE (OUTPATIENT)
Dept: GENERAL RADIOLOGY | Facility: CLINIC | Age: 25
End: 2025-05-05
Attending: PHYSICIAN ASSISTANT
Payer: COMMERCIAL

## 2025-05-05 ENCOUNTER — OFFICE VISIT (OUTPATIENT)
Dept: URGENT CARE | Facility: URGENT CARE | Age: 25
End: 2025-05-05
Payer: COMMERCIAL

## 2025-05-05 VITALS
BODY MASS INDEX: 22.38 KG/M2 | DIASTOLIC BLOOD PRESSURE: 74 MMHG | RESPIRATION RATE: 28 BRPM | WEIGHT: 156 LBS | OXYGEN SATURATION: 97 % | TEMPERATURE: 98.3 F | SYSTOLIC BLOOD PRESSURE: 124 MMHG | HEART RATE: 73 BPM

## 2025-05-05 DIAGNOSIS — M79.652 THIGH PAIN, MUSCULOSKELETAL, LEFT: Primary | ICD-10-CM

## 2025-05-05 DIAGNOSIS — M79.652 THIGH PAIN, MUSCULOSKELETAL, LEFT: ICD-10-CM

## 2025-05-05 LAB
ANION GAP SERPL CALCULATED.3IONS-SCNC: <1 MMOL/L (ref 3–14)
BUN SERPL-MCNC: 25 MG/DL (ref 7–30)
CALCIUM SERPL-MCNC: 9.6 MG/DL (ref 8.5–10.1)
CHLORIDE BLD-SCNC: 111 MMOL/L (ref 94–109)
CK SERPL-CCNC: 185 U/L (ref 39–308)
CO2 SERPL-SCNC: 30 MMOL/L (ref 20–32)
CREAT SERPL-MCNC: 1 MG/DL (ref 0.66–1.25)
D DIMER PPP FEU-MCNC: <0.27 UG/ML FEU (ref 0–0.5)
EGFRCR SERPLBLD CKD-EPI 2021: >90 ML/MIN/1.73M2
ERYTHROCYTE [SEDIMENTATION RATE] IN BLOOD BY WESTERGREN METHOD: 5 MM/HR (ref 0–15)
GLUCOSE BLD-MCNC: 79 MG/DL (ref 70–99)
POTASSIUM BLD-SCNC: 5.5 MMOL/L (ref 3.4–5.3)
SODIUM SERPL-SCNC: 141 MMOL/L (ref 135–145)

## 2025-05-05 PROCEDURE — 85379 FIBRIN DEGRADATION QUANT: CPT | Performed by: PHYSICIAN ASSISTANT

## 2025-05-05 PROCEDURE — 85652 RBC SED RATE AUTOMATED: CPT | Performed by: PHYSICIAN ASSISTANT

## 2025-05-05 PROCEDURE — 36415 COLL VENOUS BLD VENIPUNCTURE: CPT | Performed by: PHYSICIAN ASSISTANT

## 2025-05-05 PROCEDURE — 3078F DIAST BP <80 MM HG: CPT | Performed by: PHYSICIAN ASSISTANT

## 2025-05-05 PROCEDURE — 99213 OFFICE O/P EST LOW 20 MIN: CPT | Performed by: PHYSICIAN ASSISTANT

## 2025-05-05 PROCEDURE — 3074F SYST BP LT 130 MM HG: CPT | Performed by: PHYSICIAN ASSISTANT

## 2025-05-05 PROCEDURE — 73552 X-RAY EXAM OF FEMUR 2/>: CPT | Mod: TC | Performed by: STUDENT IN AN ORGANIZED HEALTH CARE EDUCATION/TRAINING PROGRAM

## 2025-05-05 PROCEDURE — 82550 ASSAY OF CK (CPK): CPT | Performed by: PHYSICIAN ASSISTANT

## 2025-05-05 PROCEDURE — 80048 BASIC METABOLIC PNL TOTAL CA: CPT | Performed by: PHYSICIAN ASSISTANT

## 2025-05-05 RX ORDER — NAPROXEN 500 MG/1
500 TABLET ORAL 2 TIMES DAILY WITH MEALS
Qty: 30 TABLET | Refills: 3 | Status: SHIPPED | OUTPATIENT
Start: 2025-05-05 | End: 2026-05-05

## 2025-05-05 RX ORDER — METHOCARBAMOL 750 MG/1
750 TABLET, FILM COATED ORAL 3 TIMES DAILY PRN
Qty: 30 TABLET | Refills: 0 | Status: SHIPPED | OUTPATIENT
Start: 2025-05-05

## 2025-05-05 NOTE — PROGRESS NOTES
Assessment & Plan     Thigh pain, musculoskeletal, left    D-dimer neg for DVT  CK level neg for Rhadbo  ESR is neg    Different problems with the bones, muscles, nerves, ligaments, and tendons in the body can cause pain. One or more areas of your body may ache or burn, or feel tired, stiff, or sore. This is called musculoskeletal pain.  This type of pain can have many different causes. Sometimes it's caused by an injury such as a strain or sprain    Warm moist compresses  Ice compresses    Start on medications  Naprosyn  Robaxin    Hyperkalemia    Patient is taking electrolytes a lot  Advised to continue water intake but cut down on electrolyte drinksg    - Basic metabolic panel  (Ca, Cl, CO2, Creat, Gluc, K, Na, BUN)       At today's visit with Hawk Wiggins , we discussed results, diagnosis, medications and formulated a plan.  We also discussed red flags for immediate return to clinic/ER, as well as indications for follow up with PCP if not improved in 3 days. Patient understood and agreed to plan. Hawk Wiggins was discharged with stable vitals and has no further questions.       No follow-ups on file.    Alek Victoria, [facility], PA-C  [facility]    Michael Arechiga is a 24 year old male who presents to clinic today for the following health issues:  Chief Complaint   Patient presents with    Musculoskeletal Problem     Left leg/inner thigh pain for couple days. Pain level-7.5-8         5/5/2025    12:33 PM   Additional Questions   Roomed by Mallory   Accompanied by self     HPI  Review of Systems  Constitutional, HEENT, cardiovascular, pulmonary, gi and gu systems are negative, except as otherwise noted.      Objective    /74   Pulse 73   Temp 98.3  F (36.8  C) (Tympanic)   Resp 28   Wt 70.8 kg (156 lb)   SpO2 97%   BMI 22.38 kg/m    Physical Exam   GENERAL: alert and no distress  ABDOMEN: soft, nontender, no hepatosplenomegaly, no masses and bowel sounds normal  MS: pos for  left inner thigh tenderness, localized pain  SKIN: no suspicious lesions or rashes  NEURO: Normal strength and tone, mentation intact and speech normal  PSYCH: mentation appears normal, affect normal/bright      Results for orders placed or performed in visit on 05/05/25   XR Femur Left 2 Views     Status: None    Narrative    EXAM: XR FEMUR LEFT 2 VIEWS  LOCATION: [facility]  DATE: 5/5/2025    INDICATION:  Thigh pain, musculoskeletal, left  COMPARISON: None.      Impression    IMPRESSION: Within normal limits. No fracture.   Results for orders placed or performed in visit on 05/05/25   D dimer quantitative     Status: Normal   Result Value Ref Range    D-Dimer Quantitative <0.27 0.00 - 0.50 ug/mL FEU    Narrative    This D-dimer assay is intended for use in conjunction with a clinical pretest probability assessment model to exclude pulmonary embolism (PE) and deep venous thrombosis (DVT) in outpatients suspected of PE or DVT. The cut-off value is 0.50 ug/mL FEU.   CK total     Status: Normal   Result Value Ref Range     39 - 308 U/L   ESR: Erythrocyte sedimentation rate     Status: Normal   Result Value Ref Range    Erythrocyte Sedimentation Rate 5 0 - 15 mm/hr   Basic metabolic panel  (Ca, Cl, CO2, Creat, Gluc, K, Na, BUN)     Status: Abnormal   Result Value Ref Range    Sodium 141 135 - 145 mmol/L    Potassium 5.5 (H) 3.4 - 5.3 mmol/L    Chloride 111 (H) 94 - 109 mmol/L    Carbon Dioxide (CO2) 30 20 - 32 mmol/L    Anion Gap <1 (L) 3 - 14 mmol/L    Urea Nitrogen 25 7 - 30 mg/dL    Creatinine 1.00 0.66 - 1.25 mg/dL    GFR Estimate >90 >60 mL/min/1.73m2    Calcium 9.6 8.5 - 10.1 mg/dL    Glucose 79 70 - 99 mg/dL

## 2025-05-05 NOTE — TELEPHONE ENCOUNTER
ONSET: 2 days ago  LOCATION: L thigh - near the groin area  PAIN: intermittently 7/10 - keep pulsing - feels like pinched   No redness, no swelling, no warmness to the touch, no bruises, Looks completely normal from the external  ACTIVITY: No strenuous exercise, no muscle strains  RADIATIONS: Stays in the L inner thigh - localized  MEDICATIONS: tylenol and Advil - helping only 10%     Pt called in the clinic requesting an appt to be seen for inner thigh pain that happened 2 days ago. RN triaged pt, [facility]. RN unable to schedule. Gave pt [facility] number to be scheduled with PCP. Pt verbalized understanding and has no questions or concerns.     Brett Barbosa RN  [facility]    Additional Information   Negative: [1] Major bleeding (e.g., actively dripping or spurting) AND [2] can't be stopped   Negative: Bullet wound, stabbed by knife, or other serious penetrating wound   Negative: Can't stand (bear weight) or walk   Negative: Sounds like a life-threatening emergency to the triager   Negative: Puncture wound of groin   Negative: Injury to the hip   Negative: Injury to the leg (e.g., thigh, back of upper leg)   Negative: Injury to the male genital area (e.g., penis, scrotum)   Negative: Injury to the female genital area (e.g., labia, vagina, vulva)   Negative: Injury to the lower abdomen (above groin crease)   Negative: Wound looks infected   Negative: Pain in the male genital area (e.g., penis, scrotum)   Negative: Pain in the female genital area (e.g., labia, vagina, vulva)   Negative: Pain or swelling from known or suspected hernia    Protocols used: Groin Injury and Strain-A-

## 2025-05-05 NOTE — PROGRESS NOTES
Urgent Care Clinic Visit    Chief Complaint   Patient presents with    Musculoskeletal Problem     Left leg/inner thigh pain for couple days               5/5/2025    12:33 PM   Additional Questions   Roomed by Mallory   Accompanied by self

## 2025-05-05 NOTE — LETTER
May 5, 2025      Hawk Wiggins  [id number] [address] [id number]        To Whom It May Concern:    Hawk Wiggins was seen in our clinic today.  He will miss work and school today.    Sincerely,      Alek Victoria, [facility] PAC      Electronically signed

## 2025-05-07 ENCOUNTER — OFFICE VISIT (OUTPATIENT)
Dept: INTERNAL MEDICINE | Facility: CLINIC | Age: 25
End: 2025-05-07
Payer: COMMERCIAL

## 2025-05-07 DIAGNOSIS — Z53.9 NO SHOW: Primary | ICD-10-CM

## 2025-07-12 ENCOUNTER — HEALTH MAINTENANCE LETTER (OUTPATIENT)
Age: 25
End: 2025-07-12